# Patient Record
Sex: FEMALE | Race: BLACK OR AFRICAN AMERICAN | Employment: OTHER | URBAN - METROPOLITAN AREA
[De-identification: names, ages, dates, MRNs, and addresses within clinical notes are randomized per-mention and may not be internally consistent; named-entity substitution may affect disease eponyms.]

---

## 2017-02-14 ENCOUNTER — OFFICE VISIT (OUTPATIENT)
Dept: INTERNAL MEDICINE CLINIC | Age: 67
End: 2017-02-14

## 2017-02-14 VITALS
HEIGHT: 64 IN | DIASTOLIC BLOOD PRESSURE: 82 MMHG | SYSTOLIC BLOOD PRESSURE: 155 MMHG | HEART RATE: 104 BPM | WEIGHT: 149 LBS | RESPIRATION RATE: 21 BRPM | TEMPERATURE: 98.2 F | BODY MASS INDEX: 25.44 KG/M2 | OXYGEN SATURATION: 95 %

## 2017-02-14 DIAGNOSIS — E11.9 TYPE 2 DIABETES MELLITUS WITHOUT COMPLICATION, WITHOUT LONG-TERM CURRENT USE OF INSULIN (HCC): ICD-10-CM

## 2017-02-14 DIAGNOSIS — K76.0 FATTY LIVER: ICD-10-CM

## 2017-02-14 DIAGNOSIS — I10 ESSENTIAL HYPERTENSION: ICD-10-CM

## 2017-02-14 DIAGNOSIS — R07.89 OTHER CHEST PAIN: Primary | ICD-10-CM

## 2017-02-14 NOTE — MR AVS SNAPSHOT
Visit Information Date & Time Provider Department Dept. Phone Encounter #  
 2/14/2017  1:00 PM Kiki Brink [facility] [phone] [id number] Follow-up Instructions Return in about 6 months (around 8/14/2017). Follow-up and Disposition History Upcoming Health Maintenance Date Due  
 EYE EXAM RETINAL OR DILATED Q1 3/16/2016 MICROALBUMIN Q1 2/24/2017 LIPID PANEL Q1 2/24/2017 BREAST CANCER SCRN MAMMOGRAM 3/16/2017 HEMOGLOBIN A1C Q6M 3/30/2017 FOOT EXAM Q1 9/30/2017 FOBT Q 1 YEAR AGE 50-75 2/14/2018 PAP AKA CERVICAL CYTOLOGY 10/21/2018 DTaP/Tdap/Td series (2 - Td) 2/14/2027 Allergies as of 2/14/2017  Review Complete On: 2/14/2017 By: Jossy Terry MD  
  
 Severity Noted Reaction Type Reactions Pcn [Penicillins]  09/08/2014    Unknown (comments) Current Immunizations  Never Reviewed No immunizations on file. Not reviewed this visit You Were Diagnosed With   
  
 Codes Comments Other chest pain    -  Primary ICD-10-CM: R07.89 ICD-9-CM: 786.59 Type 2 diabetes mellitus without complication, without long-term current use of insulin (HCC)     ICD-10-CM: E11.9 ICD-9-CM: 250.00 Essential hypertension     ICD-10-CM: I10 
ICD-9-CM: 401.9 Fatty liver     ICD-10-CM: K76.0 ICD-9-CM: 571.8 Vitals BP Pulse Temp Resp Height(growth percentile) Weight(growth percentile) 155/82 (BP 1 Location: Right arm, BP Patient Position: Sitting) (!) 104 98.2 °F (36.8 °C) (Oral) 21 5' 4\" (1.626 m) 149 lb (67.6 kg) SpO2 BMI OB Status Smoking Status 95% 25.58 kg/m2 Having regular periods Never Smoker BMI and BSA Data Body Mass Index Body Surface Area 25.58 kg/m 2 1.75 m 2 Preferred Pharmacy Pharmacy Name Phone [address], [address] [phone] Your Updated Medication List  
  
   
This list is accurate as of: 2/14/17  2:55 PM.  Always use your most recent med list.  
  
  
  
  
 celecoxib 200 mg capsule Commonly known as:  CELEBREX  
TAKE 1 CAPSULE BY MOUTH TWICE DAILY losartan 50 mg tablet Commonly known as:  COZAAR  
TAKE 1 TABLET BY MOUTH EVERY DAY  
  
 metFORMIN  mg tablet Commonly known as:  GLUCOPHAGE XR  
TAKE 1 TABLET BY MOUTH EVERY EVENING WITH SUPPER [facility] Generic drug:  Lancets USE DAILY AS DIRECTED  
  
 pravastatin 40 mg tablet Commonly known as:  PRAVACHOL  
TAKE 1 TABLET BY MOUTH EVERY EVENING We Performed the Following AMB POC EKG ROUTINE W/ 12 LEADS, INTER & REP [39848 CPT(R)] CBC WITH AUTOMATED DIFF [11600 CPT(R)] HEMOGLOBIN A1C WITH EAG [75076 CPT(R)] LIPID PANEL [66126 CPT(R)] METABOLIC PANEL, COMPREHENSIVE [05984 CPT(R)] TX COLLECTION VENOUS BLOOD,VENIPUNCTURE [id number] CPT(R)] TSH 3RD GENERATION [98567 CPT(R)] URINALYSIS W/ RFLX MICROSCOPIC [69890 CPT(R)] Follow-up Instructions Return in about 6 months (around 8/14/2017). To-Do List   
 02/14/2017 ECHO:  2D ECHO COMPLETE ADULT (TTE) W OR WO CONTR Introducing [facility]! Dear Conrado Gave: Thank you for requesting a [employer] account. Our records indicate that you already have an active [employer] account. You can access your account anytime at [email or web address] Have You. [email or web address] [employer] Have You Did you know that you can access your hospital and ER discharge instructions at any time in [employer]? You can also review all of your test results from your hospital stay or ER visit. Additional Information If you have questions, please visit the Frequently Asked Questions section of the [employer] website at [email or web address]. [email or web address] [employer] Have You/. Remember, [employer] is NOT to be used for urgent needs. For medical emergencies, dial 911. Now available from your iPhone and Android! Please provide this summary of care documentation to your next provider. Your primary care clinician is listed as Pritesh Azevedo. If you have any questions after today's visit, please call [phone].

## 2017-02-14 NOTE — PROGRESS NOTES
.1. Have you been to the ER, urgent care clinic since your last visit? Hospitalized since your last visit? No    2. Have you seen or consulted any other health care providers outside of the [address] [place] [address] since your last visit? Include any pap smears or colon screening.  No

## 2017-02-14 NOTE — PROGRESS NOTES
SP[facility] MEDICINE [facility]  Emmanuel Byrne MD, Tenzin Hemphill[address] [address]  Phone:  [phone]  Fax: [phone]      Chief Complaint   Patient presents with    Diabetes   Patient returns today ambulatory, alert and appropriate and has the capacity to give an accurate history. She has a known history of fatty liver, diabetes, primary hypertension, and is seen for evaluation. Patient returns today complaining of substernal chest discomfort described as an indigestion feeling with some radiation to the left. It is not associated with shortness of breath or diaphoresis, not necessarily activity related. Relieved with rest however with ten minutes of laying down. Other new complaints are denied. Patient with a known history of dyslipidemia, diabetes is concerning. SUBECTIVE:    Ash Álvarez is a 61 y.o. female +++    Current Outpatient Prescriptions   Medication Sig Dispense Refill    pravastatin (PRAVACHOL) 40 mg tablet TAKE 1 TABLET BY MOUTH EVERY EVENING 90 Tab 11    celecoxib (CELEBREX) 200 mg capsule TAKE 1 CAPSULE BY MOUTH TWICE DAILY 180 Cap 2    losartan (COZAAR) 50 mg tablet TAKE 1 TABLET BY MOUTH EVERY DAY 90 Tab 11    metFORMIN ER (GLUCOPHAGE XR) 500 mg tablet TAKE 1 TABLET BY MOUTH EVERY EVENING WITH SUPPER 90 Tab 11    MICROLET LANCET misc USE DAILY AS DIRECTED 100 Each 11     Past Medical History   Diagnosis Date    Chest pain     Diabetes ([address]     Fatty liver     Gall bladder stones     Hypertension     LBP (low back pain)     S/P colonoscopy 2014    Tenosynovitis of thumb 6/29/16     History reviewed. No pertinent past surgical history. Allergies   Allergen Reactions    Pcn [Penicillins] Unknown (comments)       REVIEW OF SYSTEMS:   No nausea or vomiting.          Social History     Social History    Marital status:      Spouse name: N/A    Number of children: N/A    Years of education: N/A     Social History Main Topics    Smoking status: Never Smoker    Smokeless tobacco: Never Used    Alcohol use No    Drug use: None    Sexual activity: Not Asked     Other Topics Concern    None     Social History Narrative    Social History: Alcohol Use Patient uses alcohol, Drinks per occasion: 2, Drinks per w [place]: 0. Smoking Status Patient is a never smoker. Marital Status: , . Lives w ith: spouse. 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 Education/School: has highschool diploma, has college diploma. [employer]: [employer].       r  Family History   Problem Relation Age of Onset    Cancer Father        OBJECTIVE:  Visit Vitals    /82 (BP 1 Location: Right arm, BP Patient Position: Sitting)    Pulse (!) 104    Temp 98.2 °F (36.8 °C) (Oral)    Resp 21    Ht 5' 4\" (1.626 m)    Wt 149 lb (67.6 kg)    SpO2 95%    BMI 25.58 kg/m2     ENT: perrla,  eom intact  NECK: supple. Thyroid normal  CHEST: clear to ascultation and percussion   HEART: regular rate and rhythm  ABD: soft, bowel sounds active  EXTREMITIES: no edema, pulse 1+     No visits with results within 3 Month(s) from this visit. Latest known visit with results is:    Office Visit on 09/30/2016   Component Date Value Ref Range Status    WBC 09/30/2016 5.1  3.4 - 10.8 x10E3/uL Final    RBC 09/30/2016 4.37  3.77 - 5.28 x10E6/uL Final    HGB 09/30/2016 12.9  11.1 - 15.9 g/dL Final    HCT 09/30/2016 38.5  34.0 - 46.6 % Final    MCV 09/30/2016 88  79 - 97 fL Final    MCH 09/30/2016 29.5  26.6 - 33.0 pg Final    MCHC 09/30/2016 33.5  31.5 - 35.7 g/dL Final    RDW 09/30/2016 13.3  12.3 - 15.4 % Final    PLATELET 83/35/9891 333  150 - 379 x10E3/uL Final    NEUTROPHILS 09/30/2016 41  % Final    Lymphocytes 09/30/2016 43  % Final    MONOCYTES 09/30/2016 13  % Final    EOSINOPHILS 09/30/2016 2  % Final    BASOPHILS 09/30/2016 1  % Final    ABS.  NEUTROPHILS 09/30/2016 2.1  1.4 - 7.0 x10E3/uL Final    Abs Lymphocytes 09/30/2016 2.2  0.7 - 3.1 x10E3/uL Final    ABS. MONOCYTES 09/30/2016 0.7  0.1 - 0.9 x10E3/uL Final    ABS. EOSINOPHILS 09/30/2016 0.1  0.0 - 0.4 x10E3/uL Final    ABS. BASOPHILS 09/30/2016 0.0  0.0 - 0.2 x10E3/uL Final    IMMATURE GRANULOCYTES 09/30/2016 0  % Final    ABS. IMM. GRANS. 09/30/2016 0.0  0.0 - 0.1 x10E3/uL Final    Hemoglobin A1c 09/30/2016 6.7* 4.8 - 5.6 % Final    Comment:          Pre-diabetes: 5.7 - 6.4           Diabetes: >6.4           Glycemic control for adults with diabetes: <7.0      Estimated average glucose 09/30/2016 146  mg/dL Final          ASSESSMENT:  1. Other chest pain    2. Type 2 diabetes mellitus without complication, without long-term current use of insulin ([facility] [address]    3. Essential hypertension    4. Fatty liver      Patient's chest pain is concerning. We will ask for an EKG today as well as obtain a stress echocardiogram.      Appropriate laboratory studies will be checked. If the cardiac evaluation is negative she will see us in six months, sooner if it is positive. Laboratory studies will be mailed to her as well as the stress test results. Blood pressure repeat is 142/80. PLAN:  .  Orders Placed This Encounter    URINALYSIS W/ RFLX MICROSCOPIC    CBC WITH AUTOMATED DIFF    METABOLIC PANEL, COMPREHENSIVE    LIPID PANEL    TSH 3RD GENERATION    HEMOGLOBIN A1C WITH EAG    AMB POC EKG ROUTINE W/ 12 LEADS, INTER & REP    2D ECHO COMPLETE ADULT (TTE) W OR WO CONTR       Follow-up Disposition:  Return in about 6 months (around 8/14/2017). ATTENTION:   This medical record was transcribed using an electronic medical records system. Although proofread, it may and can contain electronic and spelling errors. Other human spelling and other errors may be present. Corrections may be executed at a later time. Please feel free to contact us for any clarifications as needed.

## 2017-02-15 LAB
ALBUMIN SERPL-MCNC: 4.9 G/DL (ref 3.6–4.8)
ALBUMIN/GLOB SERPL: 1.7 {RATIO} (ref 1.1–2.5)
ALP SERPL-CCNC: 74 IU/L (ref 39–117)
ALT SERPL-CCNC: 10 IU/L (ref 0–32)
APPEARANCE UR: ABNORMAL
AST SERPL-CCNC: 28 IU/L (ref 0–40)
BASOPHILS # BLD AUTO: 0 X10E3/UL (ref 0–0.2)
BASOPHILS NFR BLD AUTO: 0 %
BILIRUB SERPL-MCNC: 0.6 MG/DL (ref 0–1.2)
BILIRUB UR QL STRIP: NEGATIVE
BUN SERPL-MCNC: 11 MG/DL (ref 8–27)
BUN/CREAT SERPL: 16 (ref 11–26)
CALCIUM SERPL-MCNC: 9.9 MG/DL (ref 8.7–10.3)
CHLORIDE SERPL-SCNC: 103 MMOL/L (ref 96–106)
CHOLEST SERPL-MCNC: 204 MG/DL (ref 100–199)
CO2 SERPL-SCNC: 26 MMOL/L (ref 18–29)
COLOR UR: YELLOW
CREAT SERPL-MCNC: 0.69 MG/DL (ref 0.57–1)
EOSINOPHIL # BLD AUTO: 0.1 X10E3/UL (ref 0–0.4)
EOSINOPHIL NFR BLD AUTO: 2 %
ERYTHROCYTE [DISTWIDTH] IN BLOOD BY AUTOMATED COUNT: 13.8 % (ref 12.3–15.4)
EST. AVERAGE GLUCOSE BLD GHB EST-MCNC: 137 MG/DL
GLOBULIN SER CALC-MCNC: 2.9 G/DL (ref 1.5–4.5)
GLUCOSE SERPL-MCNC: 73 MG/DL (ref 65–99)
GLUCOSE UR QL: NEGATIVE
HBA1C MFR BLD: 6.4 % (ref 4.8–5.6)
HCT VFR BLD AUTO: 42.6 % (ref 34–46.6)
HDLC SERPL-MCNC: 59 MG/DL
HGB BLD-MCNC: 14.1 G/DL (ref 11.1–15.9)
HGB UR QL STRIP: NEGATIVE
IMM GRANULOCYTES # BLD: 0 X10E3/UL (ref 0–0.1)
IMM GRANULOCYTES NFR BLD: 0 %
KETONES UR QL STRIP: NEGATIVE
LDLC SERPL CALC-MCNC: 116 MG/DL (ref 0–99)
LEUKOCYTE ESTERASE UR QL STRIP: NEGATIVE
LYMPHOCYTES # BLD AUTO: 3.2 X10E3/UL (ref 0.7–3.1)
LYMPHOCYTES NFR BLD AUTO: 57 %
MCH RBC QN AUTO: 29.4 PG (ref 26.6–33)
MCHC RBC AUTO-ENTMCNC: 33.1 G/DL (ref 31.5–35.7)
MCV RBC AUTO: 89 FL (ref 79–97)
MICRO URNS: ABNORMAL
MONOCYTES # BLD AUTO: 0.4 X10E3/UL (ref 0.1–0.9)
MONOCYTES NFR BLD AUTO: 6 %
NEUTROPHILS # BLD AUTO: 2 X10E3/UL (ref 1.4–7)
NEUTROPHILS NFR BLD AUTO: 35 %
NITRITE UR QL STRIP: NEGATIVE
PH UR STRIP: 7 [PH] (ref 5–7.5)
PLATELET # BLD AUTO: 273 X10E3/UL (ref 150–379)
POTASSIUM SERPL-SCNC: 4.2 MMOL/L (ref 3.5–5.2)
PROT SERPL-MCNC: 7.8 G/DL (ref 6–8.5)
PROT UR QL STRIP: NEGATIVE
RBC # BLD AUTO: 4.79 X10E6/UL (ref 3.77–5.28)
SODIUM SERPL-SCNC: 146 MMOL/L (ref 134–144)
SP GR UR: 1.02 (ref 1–1.03)
TRIGL SERPL-MCNC: 144 MG/DL (ref 0–149)
TSH SERPL DL<=0.005 MIU/L-ACNC: 2.16 UIU/ML (ref 0.45–4.5)
UROBILINOGEN UR STRIP-MCNC: 0.2 MG/DL (ref 0.2–1)
VLDLC SERPL CALC-MCNC: 29 MG/DL (ref 5–40)
WBC # BLD AUTO: 5.7 X10E3/UL (ref 3.4–10.8)

## 2017-02-16 ENCOUNTER — TELEPHONE (OUTPATIENT)
Dept: INTERNAL MEDICINE CLINIC | Age: 67
End: 2017-02-16

## 2017-02-20 ENCOUNTER — CLINICAL SUPPORT (OUTPATIENT)
Dept: INTERNAL MEDICINE CLINIC | Age: 67
End: 2017-02-20

## 2017-02-20 DIAGNOSIS — R77.1 ELEVATED SERUM GLOBULIN LEVEL: Primary | ICD-10-CM

## 2017-02-22 LAB
ALBUMIN SERPL ELPH-MCNC: 3.9 G/DL (ref 2.9–4.4)
ALBUMIN/GLOB SERPL: 1.1 {RATIO} (ref 0.7–1.7)
ALPHA1 GLOB SERPL ELPH-MCNC: 0.2 G/DL (ref 0–0.4)
ALPHA2 GLOB SERPL ELPH-MCNC: 0.8 G/DL (ref 0.4–1)
B-GLOBULIN SERPL ELPH-MCNC: 1.2 G/DL (ref 0.7–1.3)
GAMMA GLOB SERPL ELPH-MCNC: 1.2 G/DL (ref 0.4–1.8)
GLOBULIN SER CALC-MCNC: 3.4 G/DL (ref 2.2–3.9)
IGA SERPL-MCNC: 190 MG/DL (ref 87–352)
IGG SERPL-MCNC: 1065 MG/DL (ref 700–1600)
IGM SERPL-MCNC: 39 MG/DL (ref 26–217)
M PROTEIN SERPL ELPH-MCNC: NORMAL G/DL
PLEASE NOTE, 011150: NORMAL
PROT SERPL-MCNC: 7.3 G/DL (ref 6–8.5)

## 2017-02-23 ENCOUNTER — HOSPITAL ENCOUNTER (OUTPATIENT)
Dept: NON INVASIVE DIAGNOSTICS | Age: 67
Discharge: HOME | End: 2017-02-23
Attending: INTERNAL MEDICINE
Payer: OTHER GOVERNMENT

## 2017-02-23 DIAGNOSIS — R07.89 OTHER CHEST PAIN: ICD-10-CM

## 2017-02-23 DIAGNOSIS — E11.9 TYPE 2 DIABETES MELLITUS WITHOUT COMPLICATION, WITHOUT LONG-TERM CURRENT USE OF INSULIN (HCC): ICD-10-CM

## 2017-02-23 PROCEDURE — 93306 TTE W/DOPPLER COMPLETE: CPT

## 2017-05-11 RX ORDER — CLOTRIMAZOLE AND BETAMETHASONE DIPROPIONATE 10; .64 MG/G; MG/G
CREAM TOPICAL
Qty: 15 G | Refills: 0 | Status: SHIPPED | OUTPATIENT
Start: 2017-05-11 | End: 2019-10-03

## 2017-05-11 RX ORDER — FLUCONAZOLE 150 MG/1
150 TABLET ORAL DAILY
Qty: 1 TAB | Refills: 0 | Status: SHIPPED | OUTPATIENT
Start: 2017-05-11 | End: 2017-05-12

## 2017-08-30 ENCOUNTER — OFFICE VISIT (OUTPATIENT)
Dept: INTERNAL MEDICINE CLINIC | Age: 67
End: 2017-08-30

## 2017-08-30 VITALS
OXYGEN SATURATION: 94 % | RESPIRATION RATE: 18 BRPM | BODY MASS INDEX: 25.06 KG/M2 | TEMPERATURE: 33.8 F | SYSTOLIC BLOOD PRESSURE: 132 MMHG | WEIGHT: 146.8 LBS | HEIGHT: 64 IN | HEART RATE: 98 BPM | DIASTOLIC BLOOD PRESSURE: 86 MMHG

## 2017-08-30 DIAGNOSIS — I10 ESSENTIAL HYPERTENSION: ICD-10-CM

## 2017-08-30 DIAGNOSIS — E11.9 TYPE 2 DIABETES MELLITUS WITHOUT COMPLICATION, WITHOUT LONG-TERM CURRENT USE OF INSULIN (HCC): Primary | ICD-10-CM

## 2017-08-30 DIAGNOSIS — K76.0 FATTY LIVER: ICD-10-CM

## 2017-08-30 NOTE — PROGRESS NOTES
Chief Complaint   Patient presents with    Pre-op Exam     cataract surgery on 9/7/17     1. Have you been to the ER, urgent care clinic since your last visit? Hospitalized since your last visit? No    2. Have you seen or consulted any other health care providers outside of the [address] [place] [address] since your last visit? Include any pap smears or colon screening.  No

## 2017-08-30 NOTE — PROGRESS NOTES
Patient returns today alert, appropriate, ambulatory and has the capacity to give an accurate history. She plans to have removal of cataract implantation for ocular lens on the left eye by Brandon Lund ***, M.D., on 09/07/17. She has a known history of diabetes, on Metformin, blood sugar control has been excellent, fatty liver and primary hypertension. Patient is seen for evaluation.

## 2017-08-30 NOTE — PROGRESS NOTES
SPORTS MEDICINE [facility] PRIMARY CARE  Hilario Stark MD, [address] [address]  Phone:  [phone]  Fax: [phone]      Chief Complaint   Patient presents with    Pre-op Exam     cataract surgery on 9/7/17         SUBECTIVE:    Rui Dumont is a 74 y.o. female Patient returns today with known history of diabetes, primary hypertension and dyslipidemia. She voices no complaints. Her and her  are trying to sell their house, but they're concerned  sell the house. They are going to take it off the market if they don't sell it shortly and try again in the spring. Patient is seen for evaluation. She plans to have  intraocular lens on 09/07/17 . Current Outpatient Prescriptions   Medication Sig Dispense Refill    clotrimazole-betamethasone (LOTRISONE) topical cream Apply outside vagina twice a day 15 g 0    metFORMIN ER (GLUCOPHAGE XR) 500 mg tablet TAKE 1 TABLET BY MOUTH EVERY EVENING WITH SUPPER 90 Tab 11    losartan (COZAAR) 50 mg tablet TAKE 1 TABLET BY MOUTH EVERY DAY 90 Tab 11    pravastatin (PRAVACHOL) 40 mg tablet TAKE 1 TABLET BY MOUTH EVERY EVENING 90 Tab 11    MICROLET LANCET misc USE DAILY AS DIRECTED 100 Each 11    celecoxib (CELEBREX) 200 mg capsule TAKE 1 CAPSULE BY MOUTH TWICE DAILY 180 Cap 2     Past Medical History:   Diagnosis Date    Chest pain     Diabetes ([address]     Fatty liver     Gall bladder stones     Hypertension     LBP (low back pain)     S/P colonoscopy 2014    Tenosynovitis of thumb 6/29/16     History reviewed. No pertinent surgical history. Allergies   Allergen Reactions    Pcn [Penicillins] Unknown (comments)       REVIEW OF SYSTEMS:   No chest pain, no shortness of breath.         Social History     Social History    Marital status:      Spouse name: N/A    Number of children: N/A    Years of education: N/A     Social History Main Topics    Smoking status: Never Smoker    Smokeless tobacco: Never Used    Alcohol use No    Drug use: No    Sexual activity: Not Asked     Other Topics Concern    None     Social History Narrative    Social History: Alcohol Use Patient uses alcohol, Drinks per occasion: 2, Drinks per w [place]: 0. Smoking Status Patient is a never smoker. Marital Status: , . Lives w ith: spouse. 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 Education/School: has highschool diploma, has college diploma. [employer]: [employer].       r  Family History   Problem Relation Age of Onset    Cancer Father        OBJECTIVE:  Visit Vitals    /81 (BP 1 Location: Left arm, BP Patient Position: Sitting)    Pulse 98    Temp (!) 33.8 °F (1 °C)    Resp 18    Ht 5' 4\" (1.626 m)    Wt 146 lb 12.8 oz (66.6 kg)    SpO2 94%    BMI 25.2 kg/m2     ENT: perrla,  eom intact  NECK: supple. Thyroid normal  CHEST: clear to ascultation and percussion   HEART: regular rate and rhythm  ABD: soft, bowel sounds active  EXTREMITIES: no edema, pulse 1+     No visits with results within 3 Month(s) from this visit. Latest known visit with results is:    Clinical Support on 02/20/2017   Component Date Value Ref Range Status    Protein, total 02/20/2017 7.3  6.0 - 8.5 g/dL Final    Albumin 02/20/2017 3.9  2.9 - 4.4 g/dL Final    Alpha-1-globulin 02/20/2017 0.2  0.0 - 0.4 g/dL Final    ALPHA-2 GLOBULIN 02/20/2017 0.8  0.4 - 1.0 g/dL Final    Beta globulin 02/20/2017 1.2  0.7 - 1.3 g/dL Final    Gamma globulin 02/20/2017 1.2  0.4 - 1.8 g/dL Final    M-spike 02/20/2017 Not Observed  Not Observed g/dL Final    Globulin, total 02/20/2017 3.4  2.2 - 3.9 g/dL Final    A/G ratio 02/20/2017 1.1  0.7 - 1.7 Final    Please note 02/20/2017 Comment   Final    Comment: Protein electrophoresis scan will follow via computer, mail, or   delivery.       Immunoglobulin G, Qt. 02/20/2017 1065  700 - 1600 mg/dL Final    Immunoglobulin A, Qt. 02/20/2017 190  87 - 352 mg/dL Final    Immunoglobulin M, Qt. 02/20/2017 39  26 - 217 mg/dL Final          ASSESSMENT:  1. Type 2 diabetes mellitus without complication, without long-term current use of insulin ([address]    2. Essential hypertension    3. Fatty liver      Blood sugar control has been excellent. Patient's blood pressure control repeat is improved and down to 150/86. I suspect it drops down to normal outside our office. Her medical status is stable for planned surgical procedure. She'll return to see us in four to six months. PLAN:  .  Orders Placed This Encounter    METABOLIC PANEL, COMPREHENSIVE    HEMOGLOBIN A1C WITH EAG    CBC WITH AUTOMATED DIFF       Follow-up Disposition:  Return in about 6 months (around 2/28/2018). ATTENTION:   This medical record was transcribed using an electronic medical records system. Although proofread, it may and can contain electronic and spelling errors. Other human spelling and other errors may be present. Corrections may be executed at a later time. Please feel free to contact us for any clarifications as needed.

## 2017-08-30 NOTE — MR AVS SNAPSHOT
Visit Information Date & Time Provider Department Dept. Phone Encounter #  
 8/30/2017  9:15 AM Kiki Soliz [facility] [phone] [id number] Follow-up Instructions Return in about 6 months (around 2/28/2018). Follow-up and Disposition History Upcoming Health Maintenance Date Due MICROALBUMIN Q1 2/24/2017 BREAST CANCER SCRN MAMMOGRAM 3/16/2017 OSTEOPOROSIS SCREENING (DEXA) 5/21/2017 Pneumococcal 65+ Low/Medium Risk (1 of 2 - PCV13) 5/21/2017 MEDICARE YEARLY EXAM 5/21/2017 EYE EXAM RETINAL OR DILATED Q1 7/25/2017 INFLUENZA AGE 9 TO ADULT 8/1/2017 HEMOGLOBIN A1C Q6M 8/14/2017 FOOT EXAM Q1 9/30/2017 LIPID PANEL Q1 2/14/2018 FOBT Q 1 YEAR AGE 50-75 2/14/2018 GLAUCOMA SCREENING Q2Y 7/25/2018 DTaP/Tdap/Td series (2 - Td) 2/14/2027 Allergies as of 8/30/2017  Review Complete On: 8/30/2017 By: Kofi Leblanc Severity Noted Reaction Type Reactions Pcn [Penicillins]  09/08/2014    Unknown (comments) Current Immunizations  Never Reviewed No immunizations on file. Not reviewed this visit You Were Diagnosed With   
  
 Codes Comments Type 2 diabetes mellitus without complication, without long-term current use of insulin (HCC)    -  Primary ICD-10-CM: E11.9 ICD-9-CM: 250.00 Essential hypertension     ICD-10-CM: I10 
ICD-9-CM: 401.9 Fatty liver     ICD-10-CM: K76.0 ICD-9-CM: 571.8 Vitals BP Pulse Temp Resp Height(growth percentile) Weight(growth percentile) 132/86 98 (!) 33.8 °F (1 °C) 18 5' 4\" (1.626 m) 146 lb 12.8 oz (66.6 kg) SpO2 BMI OB Status Smoking Status 94% 25.2 kg/m2 Postmenopausal Never Smoker Vitals History BMI and BSA Data Body Mass Index Body Surface Area  
 25.2 kg/m 2 1.73 m 2 Preferred Pharmacy Pharmacy Name Phone  [facility] [address] [place] [address] [phone] Your Updated Medication List  
  
   
This list is accurate as of: 8/30/17 11:02 AM.  Always use your most recent med list.  
  
  
  
  
 celecoxib 200 mg capsule Commonly known as:  CELEBREX  
TAKE 1 CAPSULE BY MOUTH TWICE DAILY  
  
 clotrimazole-betamethasone topical cream  
Commonly known as:  Janie Avoca Apply outside vagina twice a day  
  
 losartan 50 mg tablet Commonly known as:  COZAAR  
TAKE 1 TABLET BY MOUTH EVERY DAY  
  
 metFORMIN  mg tablet Commonly known as:  GLUCOPHAGE XR  
TAKE 1 TABLET BY MOUTH EVERY EVENING WITH SUPPER [facility] Generic drug:  Lancets USE DAILY AS DIRECTED  
  
 pravastatin 40 mg tablet Commonly known as:  PRAVACHOL  
TAKE 1 TABLET BY MOUTH EVERY EVENING We Performed the Following CBC WITH AUTOMATED DIFF [29081 CPT(R)] HEMOGLOBIN A1C WITH EAG [41779 CPT(R)] METABOLIC PANEL, COMPREHENSIVE [12080 CPT(R)] TX COLLECTION VENOUS BLOOD,VENIPUNCTURE [id number] CPT(R)] Follow-up Instructions Return in about 6 months (around 2/28/2018). Introducing [facility]! Dear Ricardo Councilman: Thank you for requesting a [employer] account. Our records indicate that you already have an active [employer] account. You can access your account anytime at [email or web address] Did you know that you can access your hospital and ER discharge instructions at any time in [employer]? You can also review all of your test results from your hospital stay or ER visit. Additional Information If you have questions, please visit the Frequently Asked Questions section of the [employer] website at [email or web address] Remember, [employer] is NOT to be used for urgent needs. For medical emergencies, dial 911. Now available from your iPhone and Android! Please provide this summary of care documentation to your next provider. Your primary care clinician is listed as Citlalli Durant. If you have any questions after today's visit, please call [phone].

## 2017-08-31 LAB
ALBUMIN SERPL-MCNC: 4.8 G/DL (ref 3.6–4.8)
ALBUMIN/GLOB SERPL: 1.6 {RATIO} (ref 1.2–2.2)
ALP SERPL-CCNC: 71 IU/L (ref 39–117)
ALT SERPL-CCNC: 16 IU/L (ref 0–32)
AST SERPL-CCNC: 37 IU/L (ref 0–40)
BASOPHILS # BLD AUTO: 0 X10E3/UL (ref 0–0.2)
BASOPHILS NFR BLD AUTO: 1 %
BILIRUB SERPL-MCNC: 0.6 MG/DL (ref 0–1.2)
BUN SERPL-MCNC: 16 MG/DL (ref 8–27)
BUN/CREAT SERPL: 19 (ref 12–28)
CALCIUM SERPL-MCNC: 10.1 MG/DL (ref 8.7–10.3)
CHLORIDE SERPL-SCNC: 103 MMOL/L (ref 96–106)
CO2 SERPL-SCNC: 25 MMOL/L (ref 18–29)
CREAT SERPL-MCNC: 0.83 MG/DL (ref 0.57–1)
EOSINOPHIL # BLD AUTO: 0.2 X10E3/UL (ref 0–0.4)
EOSINOPHIL NFR BLD AUTO: 4 %
ERYTHROCYTE [DISTWIDTH] IN BLOOD BY AUTOMATED COUNT: 13.9 % (ref 12.3–15.4)
EST. AVERAGE GLUCOSE BLD GHB EST-MCNC: 126 MG/DL
GLOBULIN SER CALC-MCNC: 3 G/DL (ref 1.5–4.5)
GLUCOSE SERPL-MCNC: 102 MG/DL (ref 65–99)
HBA1C MFR BLD: 6 % (ref 4.8–5.6)
HCT VFR BLD AUTO: 43.6 % (ref 34–46.6)
HGB BLD-MCNC: 14.2 G/DL (ref 11.1–15.9)
IMM GRANULOCYTES # BLD: 0 X10E3/UL (ref 0–0.1)
IMM GRANULOCYTES NFR BLD: 0 %
LYMPHOCYTES # BLD AUTO: 2.3 X10E3/UL (ref 0.7–3.1)
LYMPHOCYTES NFR BLD AUTO: 49 %
MCH RBC QN AUTO: 29.3 PG (ref 26.6–33)
MCHC RBC AUTO-ENTMCNC: 32.6 G/DL (ref 31.5–35.7)
MCV RBC AUTO: 90 FL (ref 79–97)
MONOCYTES # BLD AUTO: 0.3 X10E3/UL (ref 0.1–0.9)
MONOCYTES NFR BLD AUTO: 6 %
NEUTROPHILS # BLD AUTO: 1.8 X10E3/UL (ref 1.4–7)
NEUTROPHILS NFR BLD AUTO: 40 %
PLATELET # BLD AUTO: 255 X10E3/UL (ref 150–379)
POTASSIUM SERPL-SCNC: 4.6 MMOL/L (ref 3.5–5.2)
PROT SERPL-MCNC: 7.8 G/DL (ref 6–8.5)
RBC # BLD AUTO: 4.85 X10E6/UL (ref 3.77–5.28)
SODIUM SERPL-SCNC: 143 MMOL/L (ref 134–144)
WBC # BLD AUTO: 4.6 X10E3/UL (ref 3.4–10.8)

## 2017-12-18 RX ORDER — LANCETS
EACH MISCELLANEOUS
Qty: 100 EACH | Refills: 11 | Status: SHIPPED | OUTPATIENT
Start: 2017-12-18 | End: 2020-09-09 | Stop reason: SDUPTHER

## 2018-02-01 RX ORDER — PRAVASTATIN SODIUM 40 MG/1
TABLET ORAL
Qty: 90 TAB | Refills: 0 | Status: SHIPPED | OUTPATIENT
Start: 2018-02-01 | End: 2018-05-22 | Stop reason: SDUPTHER

## 2018-03-07 ENCOUNTER — OFFICE VISIT (OUTPATIENT)
Dept: INTERNAL MEDICINE CLINIC | Age: 68
End: 2018-03-07

## 2018-03-07 VITALS
SYSTOLIC BLOOD PRESSURE: 160 MMHG | OXYGEN SATURATION: 94 % | HEIGHT: 64 IN | TEMPERATURE: 97.5 F | DIASTOLIC BLOOD PRESSURE: 101 MMHG | HEART RATE: 93 BPM | RESPIRATION RATE: 18 BRPM | WEIGHT: 146.1 LBS | BODY MASS INDEX: 24.94 KG/M2

## 2018-03-07 DIAGNOSIS — E11.9 TYPE 2 DIABETES MELLITUS WITHOUT COMPLICATION, WITHOUT LONG-TERM CURRENT USE OF INSULIN (HCC): ICD-10-CM

## 2018-03-07 DIAGNOSIS — M70.52 PES ANSERINUS BURSITIS OF LEFT KNEE: ICD-10-CM

## 2018-03-07 DIAGNOSIS — K76.0 FATTY LIVER: ICD-10-CM

## 2018-03-07 DIAGNOSIS — I10 ESSENTIAL HYPERTENSION: Primary | ICD-10-CM

## 2018-03-07 RX ORDER — LOSARTAN POTASSIUM 100 MG/1
100 TABLET ORAL DAILY
Qty: 90 TAB | Refills: 11 | Status: SHIPPED | OUTPATIENT
Start: 2018-03-07 | End: 2019-05-03 | Stop reason: SDUPTHER

## 2018-03-07 NOTE — PROGRESS NOTES
SPORTS MEDICINE AND PRIMARY CARE  Angelita De Santiago MD, [address] [address]  Phone:  [phone]  Fax: [phone]      Chief Complaint   Patient presents with    Diabetes     f/u         SUBECTIVE:    Nirmala Hernandez is a 74 y.o. female The patient returns today with known history of diabetes, primary hypertension and fatty liver. She continues to have pain in her knee. She has tried various maneuvers but refuses to take NSAIDs because of the listing of hepatotoxicity. The patient is seen for evaluation. Current Outpatient Prescriptions   Medication Sig Dispense Refill    losartan (COZAAR) 100 mg tablet Take 1 Tab by mouth daily. 90 Tab 11    pravastatin (PRAVACHOL) 40 mg tablet TAKE 1 TABLET BY MOUTH EVERY EVENING 90 Tab 0    Lancets (MICROLET LANCET) misc Use to test blood sugar once daily. Dx.e11.9 100 Each 11    glucose blood VI test strips (CONTOUR NEXT STRIPS) strip Use to test blood sugar once daily. Dx.e11.9 50 Strip 11    metFORMIN ER (GLUCOPHAGE XR) 500 mg tablet TAKE 1 TABLET BY MOUTH EVERY EVENING WITH SUPPER 90 Tab 11    clotrimazole-betamethasone (LOTRISONE) topical cream Apply outside vagina twice a day 15 g 0     Past Medical History:   Diagnosis Date    Bursitis of knee     Chest pain     Diabetes ([address]     Fatty liver     Gall bladder stones     Hypertension     LBP (low back pain)     S/P colonoscopy 2014    Tenosynovitis of thumb 6/29/16     History reviewed. No pertinent surgical history. Allergies   Allergen Reactions    Pcn [Penicillins] Unknown (comments)       REVIEW OF SYSTEMS:   No chest pain, no shortness of breath.             Social History     Social History    Marital status:      Spouse name: N/A    Number of children: N/A    Years of education: N/A     Social History Main Topics    Smoking status: Never Smoker    Smokeless tobacco: Never Used    Alcohol use No    Drug use: No    Sexual activity: Yes Partners: Male     Other Topics Concern    None     Social History Narrative    m85 - hospice  fpased colon ca,prostate ca        Social History: Alcohol Use Patient uses alcohol, Drinks per occasion: 2, Drinks per w [place]: 0. Smoking Status Patient is a never smoker. Marital Status: , . Lives w ith: spouse. 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 Education/School: has highschool diploma, has college diploma. Jain: [employer].       r  Family History   Problem Relation Age of Onset    Cancer Father        OBJECTIVE:  Visit Vitals    BP (!) 160/101    Pulse 93    Temp 97.5 °F (36.4 °C) (Oral)    Resp 18    Ht 5' 4\" (1.626 m)    Wt 146 lb 1.6 oz (66.3 kg)    SpO2 94%    BMI 25.08 kg/m2     ENT: perrla,  eom intact  NECK: supple. Thyroid normal  CHEST: clear to ascultation and percussion   HEART: regular rate and rhythm  ABD: soft, bowel sounds active  EXTREMITIES: no edema, pulse 1+     No visits with results within 3 Month(s) from this visit.   Latest known visit with results is:    Office Visit on 08/30/2017   Component Date Value Ref Range Status    Glucose 08/30/2017 102* 65 - 99 mg/dL Final    BUN 08/30/2017 16  8 - 27 mg/dL Final    Creatinine 08/30/2017 0.83  0.57 - 1.00 mg/dL Final    GFR est non-AA 08/30/2017 74  >59 mL/min/1.73 Final    GFR est AA 08/30/2017 86  >59 mL/min/1.73 Final    BUN/Creatinine ratio 08/30/2017 19  12 - 28 Final    Sodium 08/30/2017 143  134 - 144 mmol/L Final    Potassium 08/30/2017 4.6  3.5 - 5.2 mmol/L Final    Chloride 08/30/2017 103  96 - 106 mmol/L Final    CO2 08/30/2017 25  18 - 29 mmol/L Final    Calcium 08/30/2017 10.1  8.7 - 10.3 mg/dL Final    Protein, total 08/30/2017 7.8  6.0 - 8.5 g/dL Final    Albumin 08/30/2017 4.8  3.6 - 4.8 g/dL Final    GLOBULIN, TOTAL 08/30/2017 3.0  1.5 - 4.5 g/dL Final    A-G Ratio 08/30/2017 1.6  1.2 - 2.2 Final    Bilirubin, total 08/30/2017 0.6  0.0 - 1.2 mg/dL Final    Alk. phosphatase 08/30/2017 71  39 - 117 IU/L Final    AST (SGOT) 08/30/2017 37  0 - 40 IU/L Final    ALT (SGPT) 08/30/2017 16  0 - 32 IU/L Final    Hemoglobin A1c 08/30/2017 6.0* 4.8 - 5.6 % Final    Comment:          Pre-diabetes: 5.7 - 6.4           Diabetes: >6.4           Glycemic control for adults with diabetes: <7.0      Estimated average glucose 08/30/2017 126  mg/dL Final    WBC 08/30/2017 4.6  3.4 - 10.8 x10E3/uL Final    RBC 08/30/2017 4.85  3.77 - 5.28 x10E6/uL Final    HGB 08/30/2017 14.2  11.1 - 15.9 g/dL Final    HCT 08/30/2017 43.6  34.0 - 46.6 % Final    MCV 08/30/2017 90  79 - 97 fL Final    MCH 08/30/2017 29.3  26.6 - 33.0 pg Final    MCHC 08/30/2017 32.6  31.5 - 35.7 g/dL Final    RDW 08/30/2017 13.9  12.3 - 15.4 % Final    PLATELET 43/04/3866 350  150 - 379 x10E3/uL Final    NEUTROPHILS 08/30/2017 40  % Final    Lymphocytes 08/30/2017 49  % Final    MONOCYTES 08/30/2017 6  % Final    EOSINOPHILS 08/30/2017 4  % Final    BASOPHILS 08/30/2017 1  % Final    ABS. NEUTROPHILS 08/30/2017 1.8  1.4 - 7.0 x10E3/uL Final    Abs Lymphocytes 08/30/2017 2.3  0.7 - 3.1 x10E3/uL Final    ABS. MONOCYTES 08/30/2017 0.3  0.1 - 0.9 x10E3/uL Final    ABS. EOSINOPHILS 08/30/2017 0.2  0.0 - 0.4 x10E3/uL Final    ABS. BASOPHILS 08/30/2017 0.0  0.0 - 0.2 x10E3/uL Final    IMMATURE GRANULOCYTES 08/30/2017 0  % Final    ABS. IMM. GRANS. 08/30/2017 0.0  0.0 - 0.1 x10E3/uL Final          ASSESSMENT:  1. Essential hypertension    2. Type 2 diabetes mellitus without complication, without long-term current use of insulin ([address]    3. Fatty liver    4. Pes anserinus bursitis of left knee      I think the discomfort is related to an anserine bursitis. I think other pathology needs to be excluded. We will get an opinion from orthopaedics.       Blood pressure is up today which she relates to stress particularly around her mother who is in hospice now however we will increase the Cozaar to 100 mg daily. We will check a lipid panels as well as a hemoglobin A1c. She will return to the office in two weeks for a blood pressure and in four months to see me. PLAN:  .  Orders Placed This Encounter    LIPID PANEL    HEMOGLOBIN A1C WITH EAG    REFERRAL TO ORTHOPEDICS    losartan (COZAAR) 100 mg tablet       Follow-up Disposition:  Return in about 2 weeks (around 3/21/2018) for bp check. ATTENTION:   This medical record was transcribed using an electronic medical records system. Although proofread, it may and can contain electronic and spelling errors. Other human spelling and other errors may be present. Corrections may be executed at a later time. Please feel free to contact us for any clarifications as needed.

## 2018-03-07 NOTE — MR AVS SNAPSHOT
[address] [place] [address] [facility] [address] 
[phone] Patient: Hebert Rios MRN: [id number] TDP:3/85/4320 Visit Information Date & Time Provider Department Dept. Phone Encounter #  
 3/7/2018  2:30 PM Kiki Farr [facility] [id number] Follow-up Instructions Return in about 2 weeks (around 3/21/2018) for bp check. Follow-up and Disposition History Upcoming Health Maintenance Date Due COLONOSCOPY 5/21/1970 MICROALBUMIN Q1 2/24/2017 BREAST CANCER SCRN MAMMOGRAM 3/16/2017 OSTEOPOROSIS SCREENING (DEXA) 5/21/2017 MEDICARE YEARLY EXAM 5/21/2017 FOOT EXAM Q1 9/30/2017 LIPID PANEL Q1 2/14/2018 HEMOGLOBIN A1C Q6M 2/28/2018 EYE EXAM RETINAL OR DILATED Q1 9/29/2018 Pneumococcal 65+ Low/Medium Risk (2 of 2 - PPSV23) 3/7/2019 GLAUCOMA SCREENING Q2Y 9/29/2019 DTaP/Tdap/Td series (2 - Td) 2/14/2027 Allergies as of 3/7/2018  Review Complete On: 3/7/2018 By: Mike Ellis MD  
  
 Severity Noted Reaction Type Reactions Pcn [Penicillins]  09/08/2014    Unknown (comments) Current Immunizations  Never Reviewed No immunizations on file. Not reviewed this visit You Were Diagnosed With   
  
 Codes Comments Essential hypertension    -  Primary ICD-10-CM: I10 
ICD-9-CM: 401.9 Type 2 diabetes mellitus without complication, without long-term current use of insulin (HCC)     ICD-10-CM: E11.9 ICD-9-CM: 250.00 Fatty liver     ICD-10-CM: K76.0 ICD-9-CM: 571.8 Pes anserinus bursitis of left knee     ICD-10-CM: M70.52 ICD-9-CM: 726.61 Vitals BP Pulse Temp Resp Height(growth percentile) Weight(growth percentile) (!) 160/101 93 97.5 °F (36.4 °C) (Oral) 18 5' 4\" (1.626 m) 146 lb 1.6 oz (66.3 kg) SpO2 BMI OB Status Smoking Status 94% 25.08 kg/m2 Postmenopausal Never Smoker Vitals History BMI and BSA Data Body Mass Index Body Surface Area 25.08 kg/m 2 1.73 m 2 Preferred Pharmacy Pharmacy Name Phone [facility] [address] [phone] Your Updated Medication List  
  
   
This list is accurate as of 3/7/18  3:52 PM.  Always use your most recent med list.  
  
  
  
  
 clotrimazole-betamethasone topical cream  
Commonly known as:  Collene Michael Apply outside vagina twice a day  
  
 glucose blood VI test strips strip Commonly known as:  CONTOUR NEXT STRIPS Use to test blood sugar once daily. Dx.e11.9 Lancets Misc Commonly known as:  Gia Staff Use to test blood sugar once daily. Dx.e11.9  
  
 losartan 100 mg tablet Commonly known as:  COZAAR Take 1 Tab by mouth daily. metFORMIN  mg tablet Commonly known as:  GLUCOPHAGE XR  
TAKE 1 TABLET BY MOUTH EVERY EVENING WITH SUPPER  
  
 pravastatin 40 mg tablet Commonly known as:  PRAVACHOL  
TAKE 1 TABLET BY MOUTH EVERY EVENING Prescriptions Sent to Pharmacy Refills  
 losartan (COZAAR) 100 mg tablet 11 Sig: Take 1 Tab by mouth daily. Class: Normal  
 Pharmacy: [email or web address] [address] [place] [address] #: [phone] Route: Oral  
  
We Performed the Following COLLECTION VENOUS BLOOD,VENIPUNCTURE [id number] CPT(R)] HEMOGLOBIN A1C WITH EAG [62554 CPT(R)]  DIABETES FOOT EXAM [HM7 Custom] LIPID PANEL [44950 CPT(R)] REFERRAL TO ORTHOPEDICS [TXC945 Custom] Comments:  
 Please evaluate patient for knee pain. Follow-up Instructions Return in about 2 weeks (around 3/21/2018) for bp check. Referral Information Referral ID Referred By Referred To  
  
 [id number] Nita INTERIANO [facility]   
   [address] [place] [address] [facility] [address], [address] Visits Status Start Date End Date 1 New Request 3/7/18 3/7/19 If your referral has a status of pending review or denied, additional information will be sent to support the outcome of this decision. Introducing [facility] & HEALTH SERVICES! Dear Demetrius Lagos: Thank you for requesting a [employer] account. Our records indicate that you already have an active [employer] account. You can access your account anytime at [email or web address] Did you know that you can access your hospital and ER discharge instructions at any time in [employer]? You can also review all of your test results from your hospital stay or ER visit. Additional Information If you have questions, please visit the Frequently Asked Questions section of the [employer] website at [email or web address] Remember, [employer] is NOT to be used for urgent needs. For medical emergencies, dial 911. Now available from your iPhone and Android! Please provide this summary of care documentation to your next provider. Your primary care clinician is listed as Lord Caba. If you have any questions after today's visit, please call [phone].

## 2018-03-07 NOTE — PROGRESS NOTES
1. Have you been to the ER, urgent care clinic since your last visit? Hospitalized since your last visit? No    2. Have you seen or consulted any other health care providers outside of the [address] [place] [address] since your last visit? Include any pap smears or colon screening.  No     Complaints of pain in left knee

## 2018-03-07 NOTE — ASSESSMENT & PLAN NOTE
Stable, based on history, physical exam and review of pertinent labs, studies and medications; meds reconciled; continue current treatment plan.   Key Antihyperglycemic Medications             metFORMIN ER (GLUCOPHAGE XR) 500 mg tablet  (Taking) TAKE 1 TABLET BY MOUTH EVERY EVENING WITH SUPPER        Other Key Diabetic Medications             pravastatin (PRAVACHOL) 40 mg tablet  (Taking) TAKE 1 TABLET BY MOUTH EVERY EVENING    losartan (COZAAR) 50 mg tablet  (Taking) TAKE 1 TABLET BY MOUTH EVERY DAY        Lab Results   Component Value Date/Time    Hemoglobin A1c 6.0 08/30/2017 11:35 AM    Glucose 102 08/30/2017 11:35 AM    Creatinine 0.83 08/30/2017 11:35 AM    Cholesterol, total 204 02/14/2017 02:50 PM    HDL Cholesterol 59 02/14/2017 02:50 PM    LDL, calculated 116 02/14/2017 02:50 PM    Triglyceride 144 02/14/2017 02:50 PM     Diabetic Foot and Eye Exam HM Status   Topic Date Due    Diabetic Foot Care  09/30/2017    Eye Exam  09/29/2018

## 2018-03-08 LAB
CHOLEST SERPL-MCNC: 189 MG/DL (ref 100–199)
EST. AVERAGE GLUCOSE BLD GHB EST-MCNC: 134 MG/DL
HBA1C MFR BLD: 6.3 % (ref 4.8–5.6)
HDLC SERPL-MCNC: 58 MG/DL
LDLC SERPL CALC-MCNC: 111 MG/DL (ref 0–99)
TRIGL SERPL-MCNC: 99 MG/DL (ref 0–149)
VLDLC SERPL CALC-MCNC: 20 MG/DL (ref 5–40)

## 2018-04-25 ENCOUNTER — OFFICE VISIT (OUTPATIENT)
Dept: INTERNAL MEDICINE CLINIC | Age: 68
End: 2018-04-25

## 2018-04-25 VITALS
HEIGHT: 64 IN | DIASTOLIC BLOOD PRESSURE: 89 MMHG | OXYGEN SATURATION: 95 % | SYSTOLIC BLOOD PRESSURE: 147 MMHG | WEIGHT: 144.8 LBS | BODY MASS INDEX: 24.72 KG/M2 | TEMPERATURE: 97.4 F | RESPIRATION RATE: 18 BRPM | HEART RATE: 93 BPM

## 2018-04-25 DIAGNOSIS — Z12.31 ENCOUNTER FOR SCREENING MAMMOGRAM FOR MALIGNANT NEOPLASM OF BREAST: ICD-10-CM

## 2018-04-25 DIAGNOSIS — E11.9 TYPE 2 DIABETES MELLITUS WITHOUT COMPLICATION, WITHOUT LONG-TERM CURRENT USE OF INSULIN (HCC): ICD-10-CM

## 2018-04-25 DIAGNOSIS — I10 ESSENTIAL HYPERTENSION: ICD-10-CM

## 2018-04-25 DIAGNOSIS — Z63.4 BEREAVEMENT: ICD-10-CM

## 2018-04-25 DIAGNOSIS — M70.52 PES ANSERINUS BURSITIS OF LEFT KNEE: Primary | ICD-10-CM

## 2018-04-25 SDOH — SOCIAL STABILITY - SOCIAL INSECURITY: DISSAPEARANCE AND DEATH OF FAMILY MEMBER: Z63.4

## 2018-04-25 NOTE — MR AVS SNAPSHOT
[address] [place] [address] [facility] [address] [address] 
[phone] Patient: Wilhelm Aschoff MRN: [id number] WWW:[birth year] Visit Information Date & Time Provider Department Dept. Phone Encounter #  
 4/25/2018  3:45 PM Clara Grace MD [facility] Sports Medicine and Primary Care [phone] [id number] Follow-up Instructions Return in about 3 months (around 7/25/2018). Upcoming Health Maintenance Date Due MICROALBUMIN Q1 2/24/2017 BREAST CANCER SCRN MAMMOGRAM 3/16/2017 Bone Densitometry (Dexa) Screening 5/21/2017 MEDICARE YEARLY EXAM 3/14/2018 HEMOGLOBIN A1C Q6M 9/7/2018 EYE EXAM RETINAL OR DILATED Q1 9/29/2018 Pneumococcal 65+ Low/Medium Risk (2 of 2 - PPSV23) 3/7/2019 FOOT EXAM Q1 3/7/2019 LIPID PANEL Q1 3/7/2019 GLAUCOMA SCREENING Q2Y 9/29/2019 COLONOSCOPY 6/15/2024 DTaP/Tdap/Td series (2 - Td) 2/14/2027 Allergies as of 4/25/2018  Review Complete On: 4/25/2018 By: Dorinda Thompson LPN Severity Noted Reaction Type Reactions Pcn [Penicillins]  09/08/2014    Unknown (comments) Current Immunizations  Never Reviewed No immunizations on file. Not reviewed this visit You Were Diagnosed With   
  
 Codes Comments Pes anserinus bursitis of left knee    -  Primary ICD-10-CM: M70.52 ICD-9-CM: 726.61 Type 2 diabetes mellitus without complication, without long-term current use of insulin (HCC)     ICD-10-CM: E11.9 ICD-9-CM: 250.00 Essential hypertension     ICD-10-CM: I10 
ICD-9-CM: 401.9 Bereavement     ICD-10-CM: Z63.4 ICD-9-CM: V62.82 Encounter for screening mammogram for malignant neoplasm of breast     ICD-10-CM: Z12.31 
ICD-9-CM: V76.12 Vitals BP Pulse Temp Resp Height(growth percentile) Weight(growth percentile) 147/89 93 97.4 °F (36.3 °C) (Oral) 18 5' 4\" (1.626 m) 144 lb 12.8 oz (65.7 kg) SpO2 BMI OB Status Smoking Status 95% 24.85 kg/m2 Postmenopausal Never Smoker Vitals History BMI and BSA Data Body Mass Index Body Surface Area  
 24.85 kg/m 2 1.72 m 2 Preferred Pharmacy Pharmacy Name Phone [facility]  [phone] Your Updated Medication List  
  
   
This list is accurate as of 4/25/18  5:23 PM.  Always use your most recent med list.  
  
  
  
  
 clotrimazole-betamethasone topical cream  
Commonly known as:  Veronique Gull Apply outside vagina twice a day  
  
 glucose blood VI test strips strip Commonly known as:  CONTOUR NEXT TEST STRIPS Use to test blood sugar once daily. Dx.e11.9 Lancets Misc Commonly known as:  Derald Jennifer Use to test blood sugar once daily. Dx.e11.9  
  
 losartan 100 mg tablet Commonly known as:  COZAAR Take 1 Tab by mouth daily. metFORMIN  mg tablet Commonly known as:  GLUCOPHAGE XR  
TAKE 1 TABLET BY MOUTH EVERY EVENING WITH SUPPER  
  
 pravastatin 40 mg tablet Commonly known as:  PRAVACHOL  
TAKE 1 TABLET BY MOUTH EVERY EVENING We Performed the Following MICROALBUMIN, UR, RAND W/ MICROALB/CREAT RATIO [id number] CPT(R)] REFERRAL TO ORTHOPEDICS [WRI348 Custom] Comments:  
 Please evaluate patient for bursitis. Follow-up Instructions Return in about 3 months (around 7/25/2018). To-Do List   
 04/25/2018 Imaging:  [facility] BI SCREENING INCL CAD Referral Information Referral ID Referred By Referred To  
  
 [id number] Shantal Arevalo MD   
   [address] Phone: [phone] Fax: [phone] Visits Status Start Date End Date 1 New Request 4/25/18 4/25/19 If your referral has a status of pending review or denied, additional information will be sent to support the outcome of this decision. Introducing [facility] & HEALTH SERVICES! Dear Patricia Washburn: Thank you for requesting a [employer] account. Our records indicate that you already have an active [employer] account. You can access your account anytime at [email or web address] Did you know that you can access your hospital and ER discharge instructions at any time in [employer]? You can also review all of your test results from your hospital stay or ER visit. Additional Information If you have questions, please visit the Frequently Asked Questions section of the [employer] website at [email or web address] Remember, [employer] is NOT to be used for urgent needs. For medical emergencies, dial 911. Now available from your iPhone and Android! Please provide this summary of care documentation to your next provider. Your primary care clinician is listed as Amalia Caban. If you have any questions after today's visit, please call [phone].

## 2018-04-25 NOTE — PROGRESS NOTES
SPORTS MEDICINE AND PRIMARY CARE  Jessica Redman MD, [place], [address] [address]  Phone:  [phone]  Fax: [phone]      Chief Complaint   Patient presents with    Knee Pain     f/u         SUBECTIVE:    Aicha Castillo is a 74 y.o. female Patient returns today with known history of diabetes, bursitis of the knee, fatty liver, primary hypertension and since we last saw her she saw orthopedics, who told her she had pes anserinus bursitis of the left knee. A steroid injection was not performed. She prefers not to see that orthopedic doctor again. Since we last saw her her mother passed last month with  and congestive heart failure. She was in hospice. Patient states that her  tells her she's been in a  mood, which is maybe the reason she doesn't want to see that orthopedic doctor again. Current Outpatient Prescriptions   Medication Sig Dispense Refill    losartan (COZAAR) 100 mg tablet Take 1 Tab by mouth daily. 90 Tab 11    pravastatin (PRAVACHOL) 40 mg tablet TAKE 1 TABLET BY MOUTH EVERY EVENING 90 Tab 0    Lancets (MICROLET LANCET) misc Use to test blood sugar once daily. Dx.e11.9 100 Each 11    glucose blood VI test strips (CONTOUR NEXT STRIPS) strip Use to test blood sugar once daily. Dx.e11.9 50 Strip 11    metFORMIN ER (GLUCOPHAGE XR) 500 mg tablet TAKE 1 TABLET BY MOUTH EVERY EVENING WITH SUPPER 90 Tab 11    clotrimazole-betamethasone (LOTRISONE) topical cream Apply outside vagina twice a day 15 g 0     Past Medical History:   Diagnosis Date    Bereavement 2018    mother  copd,chf    Bursitis of knee     Chest pain     Diabetes ([address]     Fatty liver     Gall bladder stones     Hypertension     LBP (low back pain)     Pes anserinus bursitis of left knee     S/P colonoscopy     Tenosynovitis of thumb 16     History reviewed. No pertinent surgical history.   Allergies   Allergen Reactions    Pcn [Penicillins] Unknown (comments) REVIEW OF SYSTEMS:   No other joint discomfort except for the knee. Social History     Social History    Marital status:      Spouse name: N/A    Number of children: N/A    Years of education: N/A     Social History Main Topics    Smoking status: Never Smoker    Smokeless tobacco: Never Used    Alcohol use No    Drug use: No    Sexual activity: Yes     Partners: Male     Other Topics Concern    None     Social History Narrative    m85  chf copd  fpased colon ca,prostate ca        Social History: Alcohol Use Patient uses alcohol, Drinks per occasion: 2, Drinks per w [place]: 0. Smoking Status Patient is a never smoker. Marital Status: , . Lives w ith: spouse. 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 Education/School: has highschool diploma, has college diploma. Taoism: Sanjana Dillard.       r  Family History   Problem Relation Age of Onset    Cancer Father        OBJECTIVE:  Visit Vitals    /89    Pulse 93    Temp 97.4 °F (36.3 °C) (Oral)    Resp 18    Ht 5' 4\" (1.626 m)    Wt 144 lb 12.8 oz (65.7 kg)    SpO2 95%    BMI 24.85 kg/m2     ENT: perrla,  eom intact  NECK: supple.  Thyroid normal  CHEST: clear to ascultation and percussion   HEART: regular rate and rhythm  ABD: soft, bowel sounds active  EXTREMITIES: no edema, pulse 1+     Office Visit on 2018   Component Date Value Ref Range Status    Cholesterol, total 2018 189  100 - 199 mg/dL Final    Triglyceride 2018 99  0 - 149 mg/dL Final    HDL Cholesterol 2018 58  >39 mg/dL Final    VLDL, calculated 2018 20  5 - 40 mg/dL Final    LDL, calculated 2018 111* 0 - 99 mg/dL Final    Hemoglobin A1c 2018 6.3* 4.8 - 5.6 % Final    Comment:          Pre-diabetes: 5.7 - 6.4           Diabetes: >6.4           Glycemic control for adults with diabetes: <7.0      Estimated average glucose 2018 134  mg/dL Final ASSESSMENT:  1. Pes anserinus bursitis of left knee    2. Type 2 diabetes mellitus without complication, without long-term current use of insulin ([address]    3. Essential hypertension    4. Bereavement    5. Encounter for screening mammogram for malignant neoplasm of breast       Will refer her to orthopedics for the bursitis of the knee as Dr. Avila Sawyer advised her if the steroid injection didn't work she'd need a TKR, which she prefers not to have and would like a second orthopedic opinion. Blood sugar control was checked last month and was adequate. Patient is at her ideal body weight. Her blood pressure is a little higher than we'd like to see it, but adjustment will not be made . She'll return to the office in three months. At that time if it's greater than 130/80 we will make an adjustment in medication. She is agreeable with the plan. PLAN:  .  Orders Placed This Encounter    [facility] MAMMO BI SCREENING INCL CAD    MICROALBUMIN, UR, RAND W/ MICROALB/CREAT RATIO    REFERRAL TO ORTHOPEDICS       Follow-up Disposition:  Return in about 3 months (around 7/25/2018). ATTENTION:   This medical record was transcribed using an electronic medical records system. Although proofread, it may and can contain electronic and spelling errors. Other human spelling and other errors may be present. Corrections may be executed at a later time. Please feel free to contact us for any clarifications as needed.

## 2018-04-25 NOTE — PROGRESS NOTES
1. Have you been to the ER, urgent care clinic since your last visit? Hospitalized since your last visit? No    2. Have you seen or consulted any other health care providers outside of the [facility] since your last visit? Include any pap smears or colon screening.  No     Wants to discuss her left knee

## 2018-07-09 RX ORDER — DICLOFENAC SODIUM 10 MG/G
GEL TOPICAL 4 TIMES DAILY
Qty: 100 G | Refills: 6 | Status: SHIPPED | OUTPATIENT
Start: 2018-07-09 | End: 2018-08-01 | Stop reason: SDUPTHER

## 2018-08-01 ENCOUNTER — OFFICE VISIT (OUTPATIENT)
Dept: INTERNAL MEDICINE CLINIC | Age: 68
End: 2018-08-01

## 2018-08-01 VITALS
DIASTOLIC BLOOD PRESSURE: 84 MMHG | RESPIRATION RATE: 18 BRPM | OXYGEN SATURATION: 98 % | HEIGHT: 64 IN | HEART RATE: 83 BPM | SYSTOLIC BLOOD PRESSURE: 143 MMHG | TEMPERATURE: 97.9 F | WEIGHT: 140.5 LBS | BODY MASS INDEX: 23.99 KG/M2

## 2018-08-01 DIAGNOSIS — Z13.31 SCREENING FOR DEPRESSION: ICD-10-CM

## 2018-08-01 DIAGNOSIS — E11.9 TYPE 2 DIABETES MELLITUS WITHOUT COMPLICATION, WITHOUT LONG-TERM CURRENT USE OF INSULIN (HCC): ICD-10-CM

## 2018-08-01 DIAGNOSIS — Z00.00 MEDICARE ANNUAL WELLNESS VISIT, SUBSEQUENT: Primary | ICD-10-CM

## 2018-08-01 DIAGNOSIS — Z12.31 ENCOUNTER FOR SCREENING MAMMOGRAM FOR MALIGNANT NEOPLASM OF BREAST: ICD-10-CM

## 2018-08-01 DIAGNOSIS — K76.0 FATTY LIVER: ICD-10-CM

## 2018-08-01 DIAGNOSIS — I10 ESSENTIAL HYPERTENSION: ICD-10-CM

## 2018-08-01 DIAGNOSIS — Z78.0 POST-MENOPAUSE: ICD-10-CM

## 2018-08-01 DIAGNOSIS — Z13.39 SCREENING FOR ALCOHOLISM: ICD-10-CM

## 2018-08-01 DIAGNOSIS — M70.52 PES ANSERINUS BURSITIS OF LEFT KNEE: ICD-10-CM

## 2018-08-01 RX ORDER — NAPROXEN 500 MG/1
500 TABLET ORAL 2 TIMES DAILY WITH MEALS
Qty: 60 TAB | Refills: 3 | Status: SHIPPED | OUTPATIENT
Start: 2018-08-01 | End: 2018-08-01 | Stop reason: SDUPTHER

## 2018-08-01 RX ORDER — NAPROXEN 500 MG/1
TABLET ORAL
Qty: 180 TAB | Refills: 3 | Status: SHIPPED | OUTPATIENT
Start: 2018-08-01 | End: 2019-10-03

## 2018-08-01 NOTE — PROGRESS NOTES
1. Have you been to the ER, urgent care clinic since your last visit? Hospitalized since your last visit? No 
 
2. Have you seen or consulted any other health care providers outside of the [address] [place] [address] since your last visit? Include any pap smears or colon screening. No  
 
Swelling in left knee This is the Subsequent Medicare Annual Wellness Exam, performed 12 months or more after the Initial AWV or the last Subsequent AWV I have reviewed the patient's medical history in detail and updated the computerized patient record. History Past Medical History:  
Diagnosis Date  Bereavement 2018  
 mother  copd,chf  
 Bursitis of knee  Chest pain  Diabetes ([address]  Fatty liver  Gall bladder stones  Hypertension  LBP (low back pain)  Pes anserinus bursitis of left knee  S/P colonoscopy   Tenosynovitis of thumb 16 History reviewed. No pertinent surgical history. Current Outpatient Prescriptions Medication Sig Dispense Refill  naproxen (NAPROSYN) 500 mg tablet Take 1 Tab by mouth two (2) times daily (with meals). 60 Tab 3  
 metFORMIN ER (GLUCOPHAGE XR) 500 mg tablet TAKE 1 TABLET BY MOUTH EVERY EVENING WITH SUPPER 90 Tab 11  
 pravastatin (PRAVACHOL) 40 mg tablet TAKE 1 TABLET BY MOUTH EVERY EVENING 90 Tab 11  
 losartan (COZAAR) 100 mg tablet Take 1 Tab by mouth daily. 90 Tab 11  Lancets (MICROLET LANCET) misc Use to test blood sugar once daily. Dx.e11.9 100 Each 11  
 glucose blood VI test strips (CONTOUR NEXT STRIPS) strip Use to test blood sugar once daily. Dx.e11.9 50 Strip 11  
 clotrimazole-betamethasone (LOTRISONE) topical cream Apply outside vagina twice a day 15 g 0 Allergies Allergen Reactions  Pcn [Penicillins] Unknown (comments) Family History Problem Relation Age of Onset  Cancer Father Social History Substance Use Topics  Smoking status: Never Smoker  Smokeless tobacco: Never Used  
 Alcohol use No  
 
Patient Active Problem List  
Diagnosis Code  Hypertension I10  
 Diabetes ([facility] E11.9  Fatty liver K76.0  LBP (low back pain) M54.5  Gall bladder stones K80.20  Tenosynovitis of thumb M65.9  Chest pain R07.9  Bursitis of knee M70.50  Pes anserinus bursitis of left knee M70.52  Bereavement Z63.4 Depression Risk Factor Screening: PHQ over the last two weeks 8/1/2018 PHQ Not Done - Little interest or pleasure in doing things Not at all Feeling down, depressed, irritable, or hopeless Not at all Total Score PHQ 2 0 Alcohol Risk Factor Screening: You do not drink alcohol or very rarely. Functional Ability and Level of Safety:  
Hearing Loss Hearing is good. Activities of Daily Living The home contains: no safety equipment. Patient does total self care Fall Risk Fall Risk Assessment, last 12 mths 8/1/2018 Able to walk? Yes Fall in past 12 months? No  
 
 
Abuse Screen Patient is not abused Cognitive Screening Evaluation of Cognitive Function: 
Has your family/caregiver stated any concerns about your memory: no 
Normal 
 
Patient Care Team  
Patient Care Team: 
Sumeet Winters MD as PCP - General (Internal Medicine) Assessment/Plan Education and counseling provided: 
Are appropriate based on today's review and evaluation Diagnoses and all orders for this visit: 
 
1. Medicare annual wellness visit, subsequent 2. Screening for alcoholism -     Annual  Alcohol Screen 15 min () 3. Screening for depression -     Depression Screen Annual 
 
4. Type 2 diabetes mellitus without complication, without long-term current use of insulin ([facility] -     MICROALBUMIN, UR, RAND W/ MICROALB/CREAT RATIO 
-     URINALYSIS W/ RFLX MICROSCOPIC 
-     CBC WITH AUTOMATED DIFF 
-     METABOLIC PANEL, COMPREHENSIVE 
-     LIPID PANEL 
-     TSH 3RD GENERATION 
-     COLLECTION VENOUS BLOOD,VENIPUNCTURE 
-     HEMOGLOBIN A1C WITH EAG 5. Pes anserinus bursitis of left knee 6. Essential hypertension -     [facility] MAMMO BI SCREENING INCL CAD; Future 7. Fatty liver 8. Encounter for screening mammogram for malignant neoplasm of breast  
-     [facility] MAMMO BI SCREENING INCL CAD; Future 9. Post-menopause -     DEXA BONE DENSITY APPENDICULAR; Future Other orders 
-     naproxen (NAPROSYN) 500 mg tablet; Take 1 Tab by mouth two (2) times daily (with meals). Health Maintenance Due Topic Date Due  
 MICROALBUMIN Q1  2017  BREAST CANCER SCRN MAMMOGRAM  2017  Bone Densitometry (Dexa) Screening  2017  MEDICARE YEARLY EXAM  2018  Influenza Age 5 to Adult  2018 [facility] Gaby Flor MD, [address] [address] Phone:  [phone]  Fax: [phone] Chief Complaint Patient presents with Rui Recinos Annual Wellness Visit SUBECTIVE: 
 
[facility] is a 79 y.o. female  Dictation on: 2018  5:13 PM by: Azalea Black [[id number]] Current Outpatient Prescriptions Medication Sig Dispense Refill  naproxen (NAPROSYN) 500 mg tablet Take 1 Tab by mouth two (2) times daily (with meals). 60 Tab 3  
 metFORMIN ER (GLUCOPHAGE XR) 500 mg tablet TAKE 1 TABLET BY MOUTH EVERY EVENING WITH SUPPER 90 Tab 11  
 pravastatin (PRAVACHOL) 40 mg tablet TAKE 1 TABLET BY MOUTH EVERY EVENING 90 Tab 11  
 losartan (COZAAR) 100 mg tablet Take 1 Tab by mouth daily. 90 Tab 11  Lancets (MICROLET LANCET) misc Use to test blood sugar once daily. Dx.e11.9 100 Each 11  
 glucose blood VI test strips (CONTOUR NEXT STRIPS) strip Use to test blood sugar once daily. Dx.e11.9 50 Strip 11  
 clotrimazole-betamethasone (LOTRISONE) topical cream Apply outside vagina twice a day 15 g 0 Past Medical History:  
Diagnosis Date  Bereavement 2018  
 mother  copd,chf  
 Bursitis of knee  Chest pain  Diabetes ([address]  Fatty liver  Gall bladder stones  Hypertension  LBP (low back pain)  Pes anserinus bursitis of left knee  S/P colonoscopy   Tenosynovitis of thumb 16 History reviewed. No pertinent surgical history. Allergies Allergen Reactions  Pcn [Penicillins] Unknown (comments) REVIEW OF SYSTEMS: 
  Dictation on: 2018  5:14 PM by: Sandrita Cherry [[id number]] Social History Social History  Marital status:  Spouse name: N/A  
 Number of children: N/A  
 Years of education: N/A Social History Main Topics  Smoking status: Never Smoker  Smokeless tobacco: Never Used  Alcohol use No  
 Drug use: No  
 Sexual activity: Yes  
  Partners: Male Other Topics Concern  None Social History Narrative  
 m85  chf copd  fpased colon ca,prostate ca Social History: Alcohol Use Patient uses alcohol, Drinks per occasion: 2, Drinks per w Craig: 0. Smoking Status Patient is a never smoker. Marital Status: , . Lives w ith: spouse. 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 Education/School: has highschool diploma, has college diploma. Sikhism: [place].  
   
r 
Family History Problem Relation Age of Onset  Cancer Father OBJECTIVE: 
Visit Vitals  /84  Pulse 83  Temp 97.9 °F (36.6 °C) (Oral)  Resp 18  Ht 5' 4\" (1.626 m)  Wt 140 lb 8 oz (63.7 kg)  SpO2 98%  BMI 24.12 kg/m2 ENT: perrla,  eom intact NECK: supple. Thyroid normal 
CHEST: clear to ascultation and percussion HEART: regular rate and rhythm ABD: soft, bowel sounds active EXTREMITIES: no edema, pulse 1+ No visits with results within 3 Month(s) from this visit. Latest known visit with results is: 
 
Office Visit on 2018 Component Date Value Ref Range Status  Cholesterol, total 2018 189  100 - 199 mg/dL Final  
 Triglyceride 2018 99  0 - 149 mg/dL Final  
 HDL Cholesterol 03/07/2018 58  >39 mg/dL Final  
 VLDL, calculated 03/07/2018 20  5 - 40 mg/dL Final  
 LDL, calculated 03/07/2018 111* 0 - 99 mg/dL Final  
 Hemoglobin A1c 03/07/2018 6.3* 4.8 - 5.6 % Final  
 Comment:          Pre-diabetes: 5.7 - 6.4 Diabetes: >6.4 Glycemic control for adults with diabetes: <7.0  Estimated average glucose 03/07/2018 134  mg/dL Final  
 
  
 
ASSESSMENT: 
1. Medicare annual wellness visit, subsequent 2. Screening for alcoholism 3. Screening for depression 4. Type 2 diabetes mellitus without complication, without long-term current use of insulin ([facility] 5. Pes anserinus bursitis of left knee 6. Essential hypertension 7. Fatty liver 8. Encounter for screening mammogram for malignant neoplasm of breast    
9. Post-menopause Dictation on: 08/01/2018  5:20 PM by: May Daugherty [[id number]] I have discussed the diagnosis with the patient and the intended plan as seen in the 
orders above. The patient understands and agees with the plan. The patient has  
received an after visit summary and questions were answered concerning 
future plans Patient labs and/or xrays were reviewed Past records were reviewed. PLAN: 
. Orders Placed This Encounter  Depression Screen Annual  
 CHARI MAMMO BI SCREENING INCL CAD  DEXA BONE DENSITY APPENDICULAR  
 MICROALBUMIN, UR, RAND W/ MICROALB/CREAT RATIO  URINALYSIS W/ RFLX MICROSCOPIC  CBC WITH AUTOMATED DIFF  
 METABOLIC PANEL, COMPREHENSIVE  LIPID PANEL  
 TSH 3RD GENERATION  
 HEMOGLOBIN A1C WITH EAG  
 naproxen (NAPROSYN) 500 mg tablet Follow-up Disposition: 
Return in about 4 months (around 12/1/2018). ATTENTION:  
This medical record was transcribed using an electronic medical records system. Although proofread, it may and can contain electronic and spelling errors. Other human spelling and other errors may be present. Corrections may be executed at a later time. Please feel free to contact us for any clarifications as needed.

## 2018-08-01 NOTE — PATIENT INSTRUCTIONS
Medicare Wellness Visit, Female The best way to live healthy is to have a lifestyle where you eat a well-balanced diet, exercise regularly, limit alcohol use, and quit all forms of tobacco/nicotine, if applicable. Regular preventive services are another way to keep healthy. Preventive services (vaccines, screening tests, monitoring & exams) can help personalize your care plan, which helps you manage your own care. Screening tests can find health problems at the earliest stages, when they are easiest to treat. [address] [facility] follows the current, evidence-based guidelines published by the [facility] Sanjay Sindhu ([facility]) when recommending preventive services for our patients. Because we follow these guidelines, sometimes recommendations change over time as research supports it. (For example, mammograms used to be recommended annually. Even though Medicare will still pay for an annual mammogram, the newer guidelines recommend a mammogram every two years for women of average risk.) Of course, you and your provider may decide to screen more often for some diseases, based on your risk and co-morbidities (chronic disease you are already diagnosed with). Preventive services for you include: - Medicare offers their members a free annual wellness visit, which is time for you and your primary care provider to discuss and plan for your preventive service needs. Take advantage of this benefit every year! 
 
-All people over age 72 should receive the recommended pneumonia vaccines. Current USPSTF guidelines recommend a series of two vaccines for the best pneumonia protection.  
 
-All adults should have a yearly flu vaccine and a tetanus vaccine every 10 years. All adults age 61 years should receive a shingles vaccine once in their lifetime.   
 
-A bone mass density test is recommended when a woman turns 65 to screen for osteoporosis.  This test is only recommended once as a screening. Some providers will use this same test as a disease monitoring tool if you already have osteoporosis. -All adults age 38-68 years who are overweight should have a diabetes screening test once every three years.  
 
-Other screening tests & preventive services for persons with diabetes include: an eye exam to screen for diabetic retinopathy, a kidney function test, a foot exam, and stricter control over your cholesterol.  
 
-Cardiovascular screening for adults with routine risk involves an electrocardiogram (ECG) at intervals determined by the provider.  
 
-Colorectal cancer screenings should be done for adults age 54-65 years with normal risk. There are a number of acceptable methods of screening for this type of cancer. Each test has its own benefits and drawbacks. Discuss with your provider what is most appropriate for you during your annual wellness visit. The different tests include: colonoscopy (considered the best screening method), a fecal occult blood test, a fecal DNA test, and sigmoidoscopy. -Breast cancer screenings are recommended every other year for women of normal risk age 54-69 years.  
 
-Cervical cancer screenings for women over age 72 are only recommended with certain risk factors.  
 
-All adults born between [facility] should be screened once for Hepatitis C. Here is a list of your current Health Maintenance items (your personalized list of preventive services) with a due date: 
Health Maintenance Due Topic Date Due  
 Albumin Urine Test  02/24/2017  Breast Cancer Screening  03/16/2017  Bone Mineral Density   05/21/2017 [place] Annual Well Visit  03/14/2018  Flu Vaccine  08/01/2018

## 2018-08-01 NOTE — MR AVS SNAPSHOT
[address] [facility] [address] 
[phone] Patient: Rick Fallon MRN: [id number] Visit Information Date & Time Provider Department Dept. Phone Encounter #  
 8/1/2018  3:45 PM Kiki Orlando [facility] [phone] [id number] Follow-up Instructions Return in about 4 months (around 12/1/2018). Follow-up and Disposition History Your Appointments 12/11/2018  3:30 PM  
Any with Romelia Villalta MD  
[facility] ([address]) Appt Note: 4 Month Follow up [address], [place] [address] [facility] [address]  
  
   
 [address], [place] [address] Upcoming Health Maintenance Date Due MICROALBUMIN Q1 2/24/2017 BREAST CANCER SCRN MAMMOGRAM 3/16/2017 Bone Densitometry (Dexa) Screening 5/21/2017 MEDICARE YEARLY EXAM 3/14/2018 Influenza Age 5 to Adult 8/1/2018 HEMOGLOBIN A1C Q6M 9/7/2018 EYE EXAM RETINAL OR DILATED Q1 9/29/2018 Pneumococcal 65+ Low/Medium Risk (2 of 2 - PPSV23) 3/7/2019 FOOT EXAM Q1 3/7/2019 LIPID PANEL Q1 3/7/2019 GLAUCOMA SCREENING Q2Y 9/29/2019 COLONOSCOPY 6/15/2024 DTaP/Tdap/Td series (2 - Td) 2/14/2027 Allergies as of 8/1/2018  Review Complete On: 8/1/2018 By: Romelia Villalta MD  
  
 Severity Noted Reaction Type Reactions Pcn [Penicillins]  09/08/2014    Unknown (comments) Current Immunizations  Never Reviewed No immunizations on file. Not reviewed this visit You Were Diagnosed With   
  
 Codes Comments Medicare annual wellness visit, subsequent    -  Primary ICD-10-CM: Z00.00 ICD-9-CM: V70.0 Screening for alcoholism     ICD-10-CM: Z13.89 ICD-9-CM: V79.1 Screening for depression     ICD-10-CM: Z13.89 ICD-9-CM: V79.0  Type 2 diabetes mellitus without complication, without long-term current use of insulin ([facility]     ICD-10-CM: E11.9 ICD-9-CM: 250.00 Pes anserinus bursitis of left knee     ICD-10-CM: M70.52 ICD-9-CM: 726.61 Essential hypertension     ICD-10-CM: I10 
ICD-9-CM: 401.9 Fatty liver     ICD-10-CM: K76.0 ICD-9-CM: 571.8 Encounter for screening mammogram for malignant neoplasm of breast     ICD-10-CM: Z12.31 
ICD-9-CM: V76.12 Post-menopause     ICD-10-CM: Z78.0 ICD-9-CM: V49.81 Vitals BP Pulse Temp Resp Height(growth percentile) Weight(growth percentile) 143/84 83 97.9 °F (36.6 °C) (Oral) 18 5' 4\" (1.626 m) 140 lb 8 oz (63.7 kg) SpO2 BMI OB Status Smoking Status 98% 24.12 kg/m2 Postmenopausal Never Smoker Vitals History BMI and BSA Data Body Mass Index Body Surface Area  
 24.12 kg/m 2 1.7 m 2 Preferred Pharmacy Pharmacy Name Phone [address] [facility] AT [address] [place] [address]. [phone] Your Updated Medication List  
  
   
This list is accurate as of 8/1/18  5:27 PM.  Always use your most recent med list.  
  
  
  
  
 clotrimazole-betamethasone topical cream  
Commonly known as:  Beatty Scriver Apply outside vagina twice a day  
  
 glucose blood VI test strips strip Commonly known as:  CONTOUR NEXT TEST STRIPS Use to test blood sugar once daily. Dx.e11.9 Lancets Misc Commonly known as:  Catheryn Goo Use to test blood sugar once daily. Dx.e11.9  
  
 losartan 100 mg tablet Commonly known as:  COZAAR Take 1 Tab by mouth daily. metFORMIN  mg tablet Commonly known as:  GLUCOPHAGE XR  
TAKE 1 TABLET BY MOUTH EVERY EVENING WITH SUPPER  
  
 naproxen 500 mg tablet Commonly known as:  NAPROSYN Take 1 Tab by mouth two (2) times daily (with meals). pravastatin 40 mg tablet Commonly known as:  PRAVACHOL  
TAKE 1 TABLET BY MOUTH EVERY EVENING Prescriptions Sent to Pharmacy Refills naproxen (NAPROSYN) 500 mg tablet 3 Sig: Take 1 Tab by mouth two (2) times daily (with meals). Class: Normal  
 Pharmacy: [facility] [address], [address] [place] [address] AT [address] [facility] [address].  #: [phone] Route: Oral  
  
We Performed the Following CBC WITH AUTOMATED DIFF [07539 CPT(R)] COLLECTION VENOUS BLOOD,VENIPUNCTURE [id number] CPT(R)] [address] [[id number] HCPCS] HEMOGLOBIN A1C WITH EAG [56718 CPT(R)] LIPID PANEL [46166 CPT(R)] METABOLIC PANEL, COMPREHENSIVE [47853 CPT(R)] MICROALBUMIN, UR, RAND W/ MICROALB/CREAT RATIO [id number] CPT(R)] CO ANNUAL ALCOHOL SCREEN 15 MIN [id number] HCPCS] TSH 3RD GENERATION [32144 CPT(R)] URINALYSIS W/ RFLX MICROSCOPIC [63373 CPT(R)] Follow-up Instructions Return in about 4 months (around 12/1/2018). To-Do List   
 08/01/2018 Imaging:  DEXA BONE DENSITY APPENDICULAR   
  
 08/01/2018 Imaging:  [facility] MAMMO BI SCREENING INCL CAD Patient Instructions Medicare Wellness Visit, Female The best way to live healthy is to have a lifestyle where you eat a well-balanced diet, exercise regularly, limit alcohol use, and quit all forms of tobacco/nicotine, if applicable. Regular preventive services are another way to keep healthy. Preventive services (vaccines, screening tests, monitoring & exams) can help personalize your care plan, which helps you manage your own care. Screening tests can find health problems at the earliest stages, when they are easiest to treat. [facility] follows the current, evidence-based guidelines published by the [place] Sanjay Sindhu (USPSTF) when recommending preventive services for our patients. Because we follow these guidelines, sometimes recommendations change over time as research supports it. (For example, mammograms used to be recommended annually. Even though Medicare will still pay for an annual mammogram, the newer guidelines recommend a mammogram every two years for women of average risk.) Of course, you and your provider may decide to screen more often for some diseases, based on your risk and co-morbidities (chronic disease you are already diagnosed with). Preventive services for you include: - Medicare offers their members a free annual wellness visit, which is time for you and your primary care provider to discuss and plan for your preventive service needs. Take advantage of this benefit every year! 
 
-All people over age 72 should receive the recommended pneumonia vaccines. Current USPSTF guidelines recommend a series of two vaccines for the best pneumonia protection.  
 
-All adults should have a yearly flu vaccine and a tetanus vaccine every 10 years. All adults age 61 years should receive a shingles vaccine once in their lifetime.   
 
-A bone mass density test is recommended when a woman turns 65 to screen for osteoporosis. This test is only recommended once as a screening. Some providers will use this same test as a disease monitoring tool if you already have osteoporosis. -All adults age 38-68 years who are overweight should have a diabetes screening test once every three years.  
 
-Other screening tests & preventive services for persons with diabetes include: an eye exam to screen for diabetic retinopathy, a kidney function test, a foot exam, and stricter control over your cholesterol.  
 
-Cardiovascular screening for adults with routine risk involves an electrocardiogram (ECG) at intervals determined by the provider.  
 
-Colorectal cancer screenings should be done for adults age 54-65 years with normal risk. There are a number of acceptable methods of screening for this type of cancer. Each test has its own benefits and drawbacks.  Discuss with your provider what is most appropriate for you during your annual wellness visit. The different tests include: colonoscopy (considered the best screening method), a fecal occult blood test, a fecal DNA test, and sigmoidoscopy. -Breast cancer screenings are recommended every other year for women of normal risk age 54-69 years.  
 
-Cervical cancer screenings for women over age 72 are only recommended with certain risk factors.  
 
-All adults born between [facility] should be screened once for Hepatitis C. Here is a list of your current Health Maintenance items (your personalized list of preventive services) with a due date: 
Health Maintenance Due Topic Date Due  
 Albumin Urine Test  02/24/2017  Breast Cancer Screening  03/16/2017  Bone Mineral Density   05/21/2017 [address] [place] [address] Annual Well Visit  03/14/2018  Flu Vaccine  08/01/2018 Introducing [facility] & HE[facility]! Dear Lisa Espana: Thank you for requesting a [employer] account. Our records indicate that you already have an active E[employer]LL[employer] account. You can access your account anytime at [email or web address] Did you know that you can access your hospital and ER discharge instructions at any time in [employer]? You can also review all of your test results from your hospital stay or ER visit. Additional Information If you have questions, please visit the Frequently Asked Questions section of the [employer] website at [email or web address] Remember, E[employer] is NOT to be used for urgent needs. For medical emergencies, dial 911. Now available from your iPhone and Android! Please provide this summary of care documentation to your next provider. Your primary care clinician is listed as Matt Caicedo. If you have any questions after today's visit, please call [phone].

## 2018-08-03 LAB
ALBUMIN SERPL-MCNC: 4.7 G/DL (ref 3.6–4.8)
ALBUMIN/CREAT UR: 5.4 MG/G CREAT (ref 0–30)
ALBUMIN/GLOB SERPL: 1.6 {RATIO} (ref 1.2–2.2)
ALP SERPL-CCNC: 70 IU/L (ref 39–117)
ALT SERPL-CCNC: 13 IU/L (ref 0–32)
APPEARANCE UR: CLEAR
AST SERPL-CCNC: 33 IU/L (ref 0–40)
BACTERIA #/AREA URNS HPF: ABNORMAL /[HPF]
BASOPHILS # BLD AUTO: 0 X10E3/UL (ref 0–0.2)
BASOPHILS NFR BLD AUTO: 0 %
BILIRUB SERPL-MCNC: 0.5 MG/DL (ref 0–1.2)
BILIRUB UR QL STRIP: NEGATIVE
BUN SERPL-MCNC: 13 MG/DL (ref 8–27)
BUN/CREAT SERPL: 16 (ref 12–28)
CALCIUM SERPL-MCNC: 10 MG/DL (ref 8.7–10.3)
CASTS URNS QL MICRO: ABNORMAL /LPF
CHLORIDE SERPL-SCNC: 102 MMOL/L (ref 96–106)
CHOLEST SERPL-MCNC: 199 MG/DL (ref 100–199)
CO2 SERPL-SCNC: 22 MMOL/L (ref 20–29)
COLOR UR: YELLOW
CREAT SERPL-MCNC: 0.83 MG/DL (ref 0.57–1)
CREAT UR-MCNC: 92.4 MG/DL
EOSINOPHIL # BLD AUTO: 0.1 X10E3/UL (ref 0–0.4)
EOSINOPHIL NFR BLD AUTO: 1 %
EPI CELLS #/AREA URNS HPF: ABNORMAL /HPF
ERYTHROCYTE [DISTWIDTH] IN BLOOD BY AUTOMATED COUNT: 13.7 % (ref 12.3–15.4)
EST. AVERAGE GLUCOSE BLD GHB EST-MCNC: 128 MG/DL
GLOBULIN SER CALC-MCNC: 2.9 G/DL (ref 1.5–4.5)
GLUCOSE SERPL-MCNC: 82 MG/DL (ref 65–99)
GLUCOSE UR QL: NEGATIVE
HBA1C MFR BLD: 6.1 % (ref 4.8–5.6)
HCT VFR BLD AUTO: 42.9 % (ref 34–46.6)
HDLC SERPL-MCNC: 64 MG/DL
HGB BLD-MCNC: 13.3 G/DL (ref 11.1–15.9)
HGB UR QL STRIP: NEGATIVE
IMM GRANULOCYTES # BLD: 0 X10E3/UL (ref 0–0.1)
IMM GRANULOCYTES NFR BLD: 0 %
KETONES UR QL STRIP: NEGATIVE
LDLC SERPL CALC-MCNC: 111 MG/DL (ref 0–99)
LEUKOCYTE ESTERASE UR QL STRIP: ABNORMAL
LYMPHOCYTES # BLD AUTO: 2.6 X10E3/UL (ref 0.7–3.1)
LYMPHOCYTES NFR BLD AUTO: 45 %
MCH RBC QN AUTO: 28.9 PG (ref 26.6–33)
MCHC RBC AUTO-ENTMCNC: 31 G/DL (ref 31.5–35.7)
MCV RBC AUTO: 93 FL (ref 79–97)
MICRO URNS: ABNORMAL
MICROALBUMIN UR-MCNC: 5 UG/ML
MONOCYTES # BLD AUTO: 0.2 X10E3/UL (ref 0.1–0.9)
MONOCYTES NFR BLD AUTO: 4 %
MUCOUS THREADS URNS QL MICRO: PRESENT
NEUTROPHILS # BLD AUTO: 2.9 X10E3/UL (ref 1.4–7)
NEUTROPHILS NFR BLD AUTO: 50 %
NITRITE UR QL STRIP: NEGATIVE
PH UR STRIP: 5.5 [PH] (ref 5–7.5)
PLATELET # BLD AUTO: 299 X10E3/UL (ref 150–379)
POTASSIUM SERPL-SCNC: 4 MMOL/L (ref 3.5–5.2)
PROT SERPL-MCNC: 7.6 G/DL (ref 6–8.5)
PROT UR QL STRIP: NEGATIVE
RBC # BLD AUTO: 4.61 X10E6/UL (ref 3.77–5.28)
RBC #/AREA URNS HPF: ABNORMAL /HPF
SODIUM SERPL-SCNC: 143 MMOL/L (ref 134–144)
SP GR UR: 1.02 (ref 1–1.03)
TRIGL SERPL-MCNC: 119 MG/DL (ref 0–149)
TSH SERPL DL<=0.005 MIU/L-ACNC: 1.62 UIU/ML (ref 0.45–4.5)
UROBILINOGEN UR STRIP-MCNC: 0.2 MG/DL (ref 0.2–1)
VLDLC SERPL CALC-MCNC: 24 MG/DL (ref 5–40)
WBC # BLD AUTO: 5.7 X10E3/UL (ref 3.4–10.8)
WBC #/AREA URNS HPF: ABNORMAL /HPF

## 2018-08-07 NOTE — PROGRESS NOTES
The patient returns today with a known history of anserine  bursitis, dyslipidemia, diabetes, fatty liver, primary  hypertension and is seen for evaluation. Her left knee continues  to bother her and she is using Voltaren Gel without any  improvement.

## 2018-08-07 NOTE — PROGRESS NOTES
very poor audio quality - low volume - static - please read  carefully for any potential errors and/or omissions    The patient has trace fluid and raises the question  of DJD of  the cause of discomfort in addition to the . She has a known  history of  fatty liver disease and is concerned about the NSAIDs  in the affect on her liver. She has normal liver function tests  and therefore it is safe for her to use it for a short period of  time. We suggest Naprosyn b.i.d. for the next 5 days with warm  compresses and Biofreeze. Thereafter, she states she will use it  sparingly as needed after meals. Appropriate laboratory studies will be requested. She will  return to see us in 4 weeks or as needed.

## 2019-01-31 ENCOUNTER — OFFICE VISIT (OUTPATIENT)
Dept: INTERNAL MEDICINE CLINIC | Age: 69
End: 2019-01-31

## 2019-01-31 VITALS
HEIGHT: 64 IN | DIASTOLIC BLOOD PRESSURE: 87 MMHG | RESPIRATION RATE: 18 BRPM | SYSTOLIC BLOOD PRESSURE: 152 MMHG | OXYGEN SATURATION: 98 % | TEMPERATURE: 97.9 F | BODY MASS INDEX: 25.61 KG/M2 | WEIGHT: 150 LBS | HEART RATE: 94 BPM

## 2019-01-31 DIAGNOSIS — Z12.31 ENCOUNTER FOR SCREENING MAMMOGRAM FOR MALIGNANT NEOPLASM OF BREAST: ICD-10-CM

## 2019-01-31 DIAGNOSIS — M70.52 PES ANSERINUS BURSITIS OF LEFT KNEE: ICD-10-CM

## 2019-01-31 DIAGNOSIS — E11.9 TYPE 2 DIABETES MELLITUS WITHOUT COMPLICATION, WITHOUT LONG-TERM CURRENT USE OF INSULIN (HCC): Primary | ICD-10-CM

## 2019-01-31 DIAGNOSIS — K76.0 FATTY LIVER: ICD-10-CM

## 2019-01-31 DIAGNOSIS — I10 ESSENTIAL HYPERTENSION: ICD-10-CM

## 2019-01-31 RX ORDER — METOPROLOL SUCCINATE 25 MG/1
25 TABLET, EXTENDED RELEASE ORAL DAILY
Qty: 30 TAB | Refills: 11 | Status: SHIPPED | OUTPATIENT
Start: 2019-01-31 | End: 2020-02-25 | Stop reason: SDUPTHER

## 2019-01-31 NOTE — PROGRESS NOTES
1. Have you been to the ER, urgent care clinic since your last visit? Hospitalized since your last visit? No 
 
2. Have you seen or consulted any other health care providers outside of the [address] [place] [address] since your last visit? Include any pap smears or colon screening. No  
 
Wants to discuss knee pain and handicap pass

## 2019-01-31 NOTE — PROGRESS NOTES
[facility] Griselda Neu, MD, [address] [address] Phone:  [phone]  Fax: [phone] Chief Complaint Patient presents with  Hypertension Mahendra Cochran SUBJECTIVE: 
  Holland Lin is a 79 y.o. female Returns today with known history of primary hypertension, diabetes mellitus type 2, steatosis of the liver, cholelithiasis, back pain, bursitis and is seen for evaluation. Patient returns today complaining of right knee pain, aggravated by prolonged standing or walking. It is aggravated by this cold weather. She needs some help with it. She takes Naprosyn sometimes, but it causes constipation. Patient is seen for evaluation. Current Outpatient Medications Medication Sig Dispense Refill  metoprolol succinate (TOPROL-XL) 25 mg XL tablet Take 1 Tab by mouth daily. 30 Tab 11  
 naproxen (NAPROSYN) 500 mg tablet TAKE 1 TABLET BY MOUTH TWICE DAILY WITH MEALS 180 Tab 3  
 metFORMIN ER (GLUCOPHAGE XR) 500 mg tablet TAKE 1 TABLET BY MOUTH EVERY EVENING WITH SUPPER 90 Tab 11  
 pravastatin (PRAVACHOL) 40 mg tablet TAKE 1 TABLET BY MOUTH EVERY EVENING 90 Tab 11  
 losartan (COZAAR) 100 mg tablet Take 1 Tab by mouth daily. 90 Tab 11  Lancets (MICROLET LANCET) misc Use to test blood sugar once daily. Dx.e11.9 100 Each 11  
 glucose blood VI test strips (CONTOUR NEXT STRIPS) strip Use to test blood sugar once daily. Dx.e11.9 50 Strip 11  
 clotrimazole-betamethasone (LOTRISONE) topical cream Apply outside vagina twice a day 15 g 0 Past Medical History:  
Diagnosis Date  Bereavement 2018  
 mother  copd,chf  
 Bursitis of knee  Chest pain  Diabetes ([address]  Fatty liver  Gall bladder stones  Hypertension  LBP (low back pain)  Pes anserinus bursitis of left knee  S/P colonoscopy 2014  Tenosynovitis of thumb 16 History reviewed. No pertinent surgical history. Allergies Allergen Reactions  Pcn [Penicillins] Unknown (comments) REVIEW OF SYSTEMS: 
General: negative for - chills or fever ENT: negative for - headaches, nasal congestion or tinnitus Respiratory: negative for - cough, hemoptysis, shortness of breath or wheezing Cardiovascular : negative for - chest pain, edema, palpitations or shortness of breath Gastrointestinal: negative for - abdominal pain, blood in stools, heartburn or nausea/vomiting Genito-Urinary: no dysuria, trouble voiding, or hematuria Musculoskeletal: negative for - gait disturbance, joint pain, joint stiffness or joint swelling Neurological: no TIA or stroke symptoms Hematologic: no bruises, no bleeding, no swollen glands Integument: no lumps, mole changes, nail changes or rash Endocrine: no malaise/lethargy or unexpected weight changes Social History Socioeconomic History  Marital status:  Spouse name: Not on file  Number of children: Not on file  Years of education: Not on file  Highest education level: Not on file Tobacco Use  Smoking status: Never Smoker  Smokeless tobacco: Never Used Substance and Sexual Activity  Alcohol use: No  
 Drug use: No  
 Sexual activity: Yes  
  Partners: Male Social History Narrative  
 m85  chf copd  fpased colon ca,prostate ca Social History: Alcohol Use Patient uses alcohol, Drinks per occasion: 2, Drinks per w [employer]: 0. Smoking Status Patient is a never smoker. Marital Status: , . Lives w ith: spouse. 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 Education/School: has highschool diploma, has college diploma. [employer]ism: [employer]. Family History Problem Relation Age of Onset  Cancer Father OBJECTIVE: 
 
Visit Vitals /87 Pulse 94 Temp 97.9 °F (36.6 °C) (Oral) Resp 18 Ht 5' 4\" (1.626 m) Wt 150 lb (68 kg) SpO2 98% BMI 25.75 kg/m² CONSTITUTIONAL: well , well nourished, appears age appropriate EYES: perrla, eom intact ENMT:moist mucous membranes, pharynx clear NECK: supple. Thyroid normal 
RESPIRATORY: Chest: clear bilaterally CARDIOVASCULAR: Heart: regular rate and rhythm GASTROINTESTINAL: Abdomen: soft, bowel sounds active HEMATOLOGIC: no pathological lymph nodes palpated MUSCULOSKELETAL: Extremities: no edema, pulse 1+ INTEGUMENT: No unusual rashes or suspicious skin lesions noted. Nails appear normal. 
NEUROLOGIC: non-focal exam  
MENTAL STATUS: alert and oriented, appropriate affect ASSESSMENT: 
1. Type 2 diabetes mellitus without complication, without long-term current use of insulin ([facility] [address] 2. Essential hypertension 3. Fatty liver 4. Pes anserinus bursitis of left knee 5. Encounter for screening mammogram for malignant neoplasm of breast    
 
I am not particularly happy with her blood pressure control. She states it is sometimes in the 130s-140s, but not consistently less than 130. Therefore we will put her on a small dose of a beta blocker at bedtime to see if we can drop her blood pressure to 120s. She will continue the Losartan in the morning. We also request a mammogram.  She prefers to have it someplace on the [address] [place] [address]. We refer her to [address] [place] [address]. She is agreeable. She will return to the office in 3-4 months, sooner if she needs to. I will check her LFTs in view of her history of hepatic steatosis. I have discussed the diagnosis with the patient and the intended plan as seen in the 
orders above. The patient understands and agees with the plan. The patient has  
received an after visit summary and questions were answered concerning 
future plans Patient labs and/or xrays were reviewed Past records were reviewed. PLAN: 
. Orders Placed This Encounter  CHARI MAMMO BI SCREENING INCL CAD  HEMOGLOBIN A1C WITH EAG  
 METABOLIC PANEL, COMPREHENSIVE  
  metoprolol succinate (TOPROL-XL) 25 mg XL tablet Follow-up Disposition: 
Return in about 4 months (around 5/31/2019). ATTENTION:  
This medical record was transcribed using an electronic medical records system. Although proofread, it may and can contain electronic and spelling errors. Other human spelling and other errors may be present. Corrections may be executed at a later time. Please feel free to contact us for any clarifications as needed.

## 2019-02-01 LAB
ALBUMIN SERPL-MCNC: 4.4 G/DL (ref 3.6–4.8)
ALBUMIN/GLOB SERPL: 1.5 {RATIO} (ref 1.2–2.2)
ALP SERPL-CCNC: 62 IU/L (ref 39–117)
ALT SERPL-CCNC: 16 IU/L (ref 0–32)
AST SERPL-CCNC: 34 IU/L (ref 0–40)
BILIRUB SERPL-MCNC: 0.8 MG/DL (ref 0–1.2)
BUN SERPL-MCNC: 14 MG/DL (ref 8–27)
BUN/CREAT SERPL: 18 (ref 12–28)
CALCIUM SERPL-MCNC: 9.8 MG/DL (ref 8.7–10.3)
CHLORIDE SERPL-SCNC: 102 MMOL/L (ref 96–106)
CO2 SERPL-SCNC: 25 MMOL/L (ref 20–29)
CREAT SERPL-MCNC: 0.76 MG/DL (ref 0.57–1)
EST. AVERAGE GLUCOSE BLD GHB EST-MCNC: 148 MG/DL
GLOBULIN SER CALC-MCNC: 2.9 G/DL (ref 1.5–4.5)
GLUCOSE SERPL-MCNC: 154 MG/DL (ref 65–99)
HBA1C MFR BLD: 6.8 % (ref 4.8–5.6)
POTASSIUM SERPL-SCNC: 4.1 MMOL/L (ref 3.5–5.2)
PROT SERPL-MCNC: 7.3 G/DL (ref 6–8.5)
SODIUM SERPL-SCNC: 141 MMOL/L (ref 134–144)

## 2019-05-30 ENCOUNTER — OFFICE VISIT (OUTPATIENT)
Dept: INTERNAL MEDICINE CLINIC | Age: 69
End: 2019-05-30

## 2019-05-30 VITALS
RESPIRATION RATE: 15 BRPM | BODY MASS INDEX: 25.74 KG/M2 | DIASTOLIC BLOOD PRESSURE: 82 MMHG | TEMPERATURE: 97.9 F | HEART RATE: 90 BPM | SYSTOLIC BLOOD PRESSURE: 136 MMHG | OXYGEN SATURATION: 97 % | WEIGHT: 150.8 LBS | HEIGHT: 64 IN

## 2019-05-30 DIAGNOSIS — K76.0 FATTY LIVER: ICD-10-CM

## 2019-05-30 DIAGNOSIS — E11.9 TYPE 2 DIABETES MELLITUS WITHOUT COMPLICATION, WITHOUT LONG-TERM CURRENT USE OF INSULIN (HCC): Primary | ICD-10-CM

## 2019-05-30 DIAGNOSIS — I10 ESSENTIAL HYPERTENSION: ICD-10-CM

## 2019-05-30 DIAGNOSIS — Z12.31 ENCOUNTER FOR SCREENING MAMMOGRAM FOR MALIGNANT NEOPLASM OF BREAST: ICD-10-CM

## 2019-05-30 DIAGNOSIS — K80.20 GALL BLADDER STONES: ICD-10-CM

## 2019-05-30 RX ORDER — FLUCONAZOLE 150 MG/1
150 TABLET ORAL DAILY
Qty: 2 TAB | Refills: 1 | Status: SHIPPED | OUTPATIENT
Start: 2019-05-30 | End: 2019-05-31

## 2019-05-30 NOTE — PROGRESS NOTES
Chief Complaint   Patient presents with    Hypertension     follow up      1. Have you been to the ER, urgent care clinic since your last visit? Hospitalized since your last visit? No    2. Have you seen or consulted any other health care providers outside of the [address] [place] [address] since your last visit? Include any pap smears or colon screening.  No

## 2019-05-30 NOTE — PROGRESS NOTES
[facility]  Sangeetha Yeung MD, [address] [address]  Phone:  [phone]  Fax: [phone]       Chief Complaint   Patient presents with    Hypertension     follow up    . SUBJECTIVE:    Skye Moncada is a 81 y.o. female Patient returns today with known history of diabetes, fatty liver, gallstones, primary hypertension, and is seen for evaluation. She is using Metformin daily for her diabetes and we note that her hemoglobin A1c performed in January was 6.8, indicating excellent control. Patient is seen for evaluation. Patient returns today complaining of vaginal yeast infection. She claims that her blood sugars are not well controlled. She is celebrating her one year anniversary this summer. Patient is seen for evaluation. Current Outpatient Medications   Medication Sig Dispense Refill    fluconazole (DIFLUCAN) 150 mg tablet Take 1 Tab by mouth daily for 1 day. 2 Tab 1    losartan (COZAAR) 100 mg tablet TAKE 1 TABLET BY MOUTH DAILY 90 Tab 3    glucose blood VI test strips (CONTOUR NEXT TEST STRIPS) strip USE TO TEST BLOOD SUGAR DAILY 150 Strip 11    metoprolol succinate (TOPROL-XL) 25 mg XL tablet Take 1 Tab by mouth daily. 30 Tab 11    naproxen (NAPROSYN) 500 mg tablet TAKE 1 TABLET BY MOUTH TWICE DAILY WITH MEALS 180 Tab 3    metFORMIN ER (GLUCOPHAGE XR) 500 mg tablet TAKE 1 TABLET BY MOUTH EVERY EVENING WITH SUPPER 90 Tab 11    pravastatin (PRAVACHOL) 40 mg tablet TAKE 1 TABLET BY MOUTH EVERY EVENING 90 Tab 11    Lancets (MICROLET LANCET) misc Use to test blood sugar once daily.  Dx.e11.9 100 Each 11    clotrimazole-betamethasone (LOTRISONE) topical cream Apply outside vagina twice a day 15 g 0     Past Medical History:   Diagnosis Date    Bereavement 2018    mother  copd,chf    Bursitis of knee     Chest pain     Diabetes ([address]     Fatty liver     Gall bladder stones     Hypertension     LBP (low back pain)  Pes anserinus bursitis of left knee     S/P colonoscopy 2014    Tenosynovitis of thumb 16     History reviewed. No pertinent surgical history. Allergies   Allergen Reactions    Pcn [Penicillins] Unknown (comments)         REVIEW OF SYSTEMS:  General: negative for - chills or fever  ENT: negative for - headaches, nasal congestion or tinnitus  Respiratory: negative for - cough, hemoptysis, shortness of breath or wheezing  Cardiovascular : negative for - chest pain, edema, palpitations or shortness of breath  Gastrointestinal: negative for - abdominal pain, blood in stools, heartburn or nausea/vomiting  Genito-Urinary: no dysuria, trouble voiding, or hematuria  Musculoskeletal: negative for - gait disturbance, joint pain, joint stiffness or joint swelling  Neurological: no TIA or stroke symptoms  Hematologic: no bruises, no bleeding, no swollen glands  Integument: no lumps, mole changes, nail changes or rash  Endocrine: no malaise/lethargy or unexpected weight changes      Social History     Socioeconomic History    Marital status:      Spouse name: Not on file    Number of children: Not on file    Years of education: Not on file    Highest education level: Not on file   Tobacco Use    Smoking status: Never Smoker    Smokeless tobacco: Never Used   Substance and Sexual Activity    Alcohol use: No    Drug use: No    Sexual activity: Yes     Partners: Male   Social History Narrative    m85  chf copd  fpased colon ca,prostate ca        Social History: Alcohol Use Patient uses alcohol, Drinks per occasion: 2, Drinks per w [employer]: 0. Smoking Status Patient is a never smoker. Marital Status: , . Lives w ith: spouse. 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 Education/School: has highschool diploma, has college diploma. Faith: [employer].      Family History   Problem Relation Age of Onset    Cancer Father        OBJECTIVE:    Visit Vitals  /82   Pulse 90   Temp 97.9 °F (36.6 °C) (Oral)   Resp 15   Ht 5' 4\" (1.626 m)   Wt 150 lb 12.8 oz (68.4 kg)   SpO2 97%   BMI 25.88 kg/m²     CONSTITUTIONAL: well , well nourished, appears age appropriate  EYES: perrla, eom intact  ENMT:moist mucous membranes, pharynx clear  NECK: supple. Thyroid normal  RESPIRATORY: Chest: clear bilaterally   CARDIOVASCULAR: Heart: regular rate and rhythm  GASTROINTESTINAL: Abdomen: soft, bowel sounds active  HEMATOLOGIC: no pathological lymph nodes palpated  MUSCULOSKELETAL: Extremities: no edema, pulse 1+   INTEGUMENT: No unusual rashes or suspicious skin lesions noted. Nails appear normal.  NEUROLOGIC: non-focal exam   MENTAL STATUS: alert and oriented, appropriate affect           ASSESSMENT:  1. Type 2 diabetes mellitus without complication, without long-term current use of insulin ([address]    2. Fatty liver    3. Gall bladder stones    4. Essential hypertension    5. Encounter for screening mammogram for malignant neoplasm of breast       Patient will be treated for a yeast infection with Diflucan. She is curious about her blood type. We advised her she would ________________ unit of blood before that could be accomplished. BP control is adequate. Her BMI reflects ideal body weight. Will check hemoglobin A1c today for blood sugar control. She is not up to date with mammograms and we suggest she complete the mammograms. She will be back to see us in four to six months, sooner if she needs to. I have discussed the diagnosis with the patient and the intended plan as seen in the  orders above. The patient understands and agees with the plan. The patient has   received an after visit summary and questions were answered concerning  future plans  Patient labs and/or xrays were reviewed  Past records were reviewed.     PLAN:  .  Orders Placed This Encounter    CHARI MAMMO BI SCREENING INCL CAD    HEMOGLOBIN A1C WITH EAG    fluconazole (DIFLUCAN) 150 mg tablet       Follow-up and Dispositions    · Return in about 4 months (around 9/30/2019). ATTENTION:   This medical record was transcribed using an electronic medical records system. Although proofread, it may and can contain electronic and spelling errors. Other human spelling and other errors may be present. Corrections may be executed at a later time. Please feel free to contact us for any clarifications as needed.

## 2019-05-31 LAB
EST. AVERAGE GLUCOSE BLD GHB EST-MCNC: 169 MG/DL
HBA1C MFR BLD: 7.5 % (ref 4.8–5.6)

## 2019-06-01 RX ORDER — METFORMIN HYDROCHLORIDE 500 MG/1
1000 TABLET, EXTENDED RELEASE ORAL
Qty: 180 TAB | Refills: 11 | Status: SHIPPED | OUTPATIENT
Start: 2019-06-01 | End: 2020-08-10 | Stop reason: SDUPTHER

## 2019-07-11 RX ORDER — PRAVASTATIN SODIUM 40 MG/1
TABLET ORAL
Qty: 90 TAB | Refills: 0 | Status: SHIPPED | OUTPATIENT
Start: 2019-07-11 | End: 2019-10-04 | Stop reason: SDUPTHER

## 2019-10-03 ENCOUNTER — OFFICE VISIT (OUTPATIENT)
Dept: INTERNAL MEDICINE CLINIC | Age: 69
End: 2019-10-03

## 2019-10-03 VITALS
BODY MASS INDEX: 24.92 KG/M2 | OXYGEN SATURATION: 95 % | DIASTOLIC BLOOD PRESSURE: 102 MMHG | TEMPERATURE: 97.5 F | WEIGHT: 146 LBS | SYSTOLIC BLOOD PRESSURE: 175 MMHG | HEIGHT: 64 IN | RESPIRATION RATE: 16 BRPM | HEART RATE: 85 BPM

## 2019-10-03 DIAGNOSIS — Z13.39 SCREENING FOR ALCOHOLISM: ICD-10-CM

## 2019-10-03 DIAGNOSIS — Z12.11 SCREEN FOR COLON CANCER: ICD-10-CM

## 2019-10-03 DIAGNOSIS — K76.0 FATTY LIVER: ICD-10-CM

## 2019-10-03 DIAGNOSIS — K80.20 GALL BLADDER STONES: ICD-10-CM

## 2019-10-03 DIAGNOSIS — Z13.31 SCREENING FOR DEPRESSION: ICD-10-CM

## 2019-10-03 DIAGNOSIS — E11.9 TYPE 2 DIABETES MELLITUS WITHOUT COMPLICATION, WITHOUT LONG-TERM CURRENT USE OF INSULIN (HCC): ICD-10-CM

## 2019-10-03 DIAGNOSIS — Z00.00 MEDICARE ANNUAL WELLNESS VISIT, SUBSEQUENT: Primary | ICD-10-CM

## 2019-10-03 DIAGNOSIS — Z13.1 SCREENING FOR DIABETES MELLITUS: ICD-10-CM

## 2019-10-03 DIAGNOSIS — I10 ESSENTIAL HYPERTENSION: ICD-10-CM

## 2019-10-03 RX ORDER — TELMISARTAN 80 MG/1
80 TABLET ORAL DAILY
Qty: 30 TAB | Refills: 11 | Status: SHIPPED | OUTPATIENT
Start: 2019-10-03 | End: 2020-01-02 | Stop reason: SDUPTHER

## 2019-10-03 NOTE — PATIENT INSTRUCTIONS
Medicare Wellness Visit, Female The best way to live healthy is to have a lifestyle where you eat a well-balanced diet, exercise regularly, limit alcohol use, and quit all forms of tobacco/nicotine, if applicable. Regular preventive services are another way to keep healthy. Preventive services (vaccines, screening tests, monitoring & exams) can help personalize your care plan, which helps you manage your own care. Screening tests can find health problems at the earliest stages, when they are easiest to treat. [facility] follows the current, evidence-based guidelines published by the [facility] Sanjay Sindhu ([facility]) when recommending preventive services for our patients. Because we follow these guidelines, sometimes recommendations change over time as research supports it. (For example, mammograms used to be recommended annually. Even though Medicare will still pay for an annual mammogram, the newer guidelines recommend a mammogram every two years for women of average risk.) Of course, you and your doctor may decide to screen more often for some diseases, based on your risk and your health status. Preventive services for you include: - Medicare offers their members a free annual wellness visit, which is time for you and your primary care provider to discuss and plan for your preventive service needs. Take advantage of this benefit every year! 
-All adults over the age of 72 should receive the recommended pneumonia vaccines. Current USPSTF guidelines recommend a series of two vaccines for the best pneumonia protection.  
-All adults should have a flu vaccine yearly and a tetanus vaccine every 10 years. All adults age 61 and older should receive a shingles vaccine once in their lifetime.   
-A bone mass density test is recommended when a woman turns 65 to screen for osteoporosis. This test is only recommended one time, as a screening. Some providers will use this same test as a disease monitoring tool if you already have osteoporosis. -All adults age 38-68 who are overweight should have a diabetes screening test once every three years.  
-Other screening tests and preventive services for persons with diabetes include: an eye exam to screen for diabetic retinopathy, a kidney function test, a foot exam, and stricter control over your cholesterol.  
-Cardiovascular screening for adults with routine risk involves an electrocardiogram (ECG) at intervals determined by your doctor.  
-Colorectal cancer screenings should be done for adults age 54-65 with no increased risk factors for colorectal cancer. There are a number of acceptable methods of screening for this type of cancer. Each test has its own benefits and drawbacks. Discuss with your doctor what is most appropriate for you during your annual wellness visit. The different tests include: colonoscopy (considered the best screening method), a fecal occult blood test, a fecal DNA test, and sigmoidoscopy. -Breast cancer screenings are recommended every other year for women of normal risk, age 54-69. 
-Cervical cancer screenings for women over age 72 are only recommended with certain risk factors.  
-All adults born between [facility] should be screened once for Hepatitis C. Here is a list of your current Health Maintenance items (your personalized list of preventive services) with a due date: 
Health Maintenance Due Topic Date Due  Shingles Vaccine (1 of 2) 05/21/2002  Mammogram  03/16/2017 [address] [place] [address] Eye Exam  03/16/2017  Bone Mineral Density   05/21/2017  Pneumococcal Vaccine (1 of 2 - PCV13) 05/21/2017 [address] [place] [address] Diabetic Foot Care  03/07/2019  Albumin Urine Test  08/01/2019  Cholesterol Test   08/01/2019  Flu Vaccine  08/01/2019 [address] [place] [address] Annual Well Visit  08/02/2019  Glaucoma Screening   09/29/2019 Body Mass Index: Care Instructions Your Care Instructions Body mass index (BMI) can help you see if your weight is raising your risk for health problems. It uses a formula to compare how much you weigh with how tall you are. · A BMI lower than 18.5 is considered underweight. · A BMI between 18.5 and 24.9 is considered healthy. · A BMI between 25 and 29.9 is considered overweight. A BMI of 30 or higher is considered obese. If your BMI is in the normal range, it means that you have a lower risk for weight-related health problems. If your BMI is in the overweight or obese range, you may be at increased risk for weight-related health problems, such as high blood pressure, heart disease, stroke, arthritis or joint pain, and diabetes. If your BMI is in the underweight range, you may be at increased risk for health problems such as fatigue, lower protection (immunity) against illness, muscle loss, bone loss, hair loss, and hormone problems. BMI is just one measure of your risk for weight-related health problems. You may be at higher risk for health problems if you are not active, you eat an unhealthy diet, or you drink too much alcohol or use tobacco products. Follow-up care is a key part of your treatment and safety. Be sure to make and go to all appointments, and call your doctor if you are having problems. It's also a good idea to know your test results and keep a list of the medicines you take. How can you care for yourself at home? · Practice healthy eating habits. This includes eating plenty of fruits, vegetables, whole grains, lean protein, and low-fat dairy. · If your doctor recommends it, get more exercise. Walking is a good choice. Bit by bit, increase the amount you walk every day. Try for at least 30 minutes on most days of the week. · Do not smoke. Smoking can increase your risk for health problems. If you need help quitting, talk to your doctor about stop-smoking programs and medicines. These can increase your chances of quitting for good. · Limit alcohol to 2 drinks a day for men and 1 drink a day for women. Too much alcohol can cause health problems. If you have a BMI higher than 25 · Your doctor may do other tests to check your risk for weight-related health problems. This may include measuring the distance around your waist. A waist measurement of more than 40 inches in men or 35 inches in women can increase the risk of weight-related health problems. · Talk with your doctor about steps you can take to stay healthy or improve your health. You may need to make lifestyle changes to lose weight and stay healthy, such as changing your diet and getting regular exercise. If you have a BMI lower than 18.5 · Your doctor may do other tests to check your risk for health problems. · Talk with your doctor about steps you can take to stay healthy or improve your health. You may need to make lifestyle changes to gain or maintain weight and stay healthy, such as getting more healthy foods in your diet and doing exercises to build muscle. Where can you learn more? Go to http://cuco-thanh.info/. Enter S176 in the search box to learn more about \"Body Mass Index: Care Instructions. \" Current as of: October 13, 2016 Content Version: 11.4 © 2899-4018 [employer]. Care instructions adapted under license by [employer] (which disclaims liability or warranty for this information). If you have questions about a medical condition or this instruction, always ask your healthcare professional. [place] [address] any warranty or liability for your use of this information. Medicare Wellness Visit, Female The best way to live healthy is to have a lifestyle where you eat a well-balanced diet, exercise regularly, limit alcohol use, and quit all forms of tobacco/nicotine, if applicable. Regular preventive services are another way to keep healthy.  Preventive services (vaccines, screening tests, monitoring & exams) can help personalize your care plan, which helps you manage your own care. Screening tests can find health problems at the earliest stages, when they are easiest to treat. [facility] follows the current, evidence-based guidelines published by the [facility] Manley ([facility]) when recommending preventive services for our patients. Because we follow these guidelines, sometimes recommendations change over time as research supports it. (For example, mammograms used to be recommended annually. Even though Medicare will still pay for an annual mammogram, the newer guidelines recommend a mammogram every two years for women of average risk.) Of course, you and your doctor may decide to screen more often for some diseases, based on your risk and your health status. Preventive services for you include: - Medicare offers their members a free annual wellness visit, which is time for you and your primary care provider to discuss and plan for your preventive service needs. Take advantage of this benefit every year! 
-All adults over the age of 72 should receive the recommended pneumonia vaccines. Current USPSTF guidelines recommend a series of two vaccines for the best pneumonia protection.  
-All adults should have a flu vaccine yearly and a tetanus vaccine every 10 years. All adults age 61 and older should receive a shingles vaccine once in their lifetime.   
-A bone mass density test is recommended when a woman turns 65 to screen for osteoporosis. This test is only recommended one time, as a screening. Some providers will use this same test as a disease monitoring tool if you already have osteoporosis.  
-All adults age 38-68 who are overweight should have a diabetes screening test once every three years.  
-Other screening tests and preventive services for persons with diabetes include: an eye exam to screen for diabetic retinopathy, a kidney function test, a foot exam, and stricter control over your cholesterol.  
-Cardiovascular screening for adults with routine risk involves an electrocardiogram (ECG) at intervals determined by your doctor.  
-Colorectal cancer screenings should be done for adults age 54-65 with no increased risk factors for colorectal cancer. There are a number of acceptable methods of screening for this type of cancer. Each test has its own benefits and drawbacks. Discuss with your doctor what is most appropriate for you during your annual wellness visit. The different tests include: colonoscopy (considered the best screening method), a fecal occult blood test, a fecal DNA test, and sigmoidoscopy. -Breast cancer screenings are recommended every other year for women of normal risk, age 54-69. 
-Cervical cancer screenings for women over age 72 are only recommended with certain risk factors.  
-All adults born between [facility] should be screened once for Hepatitis C. Here is a list of your current Health Maintenance items (your personalized list of preventive services) with a due date: 
Health Maintenance Due Topic Date Due  Shingles Vaccine (1 of 2) 05/21/2002  Mammogram  03/16/2017 [address] [place] [address] Eye Exam  03/16/2017  Bone Mineral Density   05/21/2017  Pneumococcal Vaccine (1 of 2 - PCV13) 05/21/2017 [address] [place] [address] Diabetic Foot Care  03/07/2019  Albumin Urine Test  08/01/2019  Cholesterol Test   08/01/2019  Flu Vaccine  08/01/2019 [address] [place] [address] Annual Well Visit  08/02/2019  Glaucoma Screening   09/29/2019

## 2019-10-03 NOTE — PROGRESS NOTES
SPORTS MEDICINE AND PRIMARY CARE  Figueroa Palafox MD, [address] [address]  Phone:  [phone]  Fax: [phone]       Chief Complaint   Patient presents with   [facility] Wellness Visit     Patient is here for a medicare wellness. .      SUBJECTIVE:    Thomas Somers is a 81 y.o. female Patient returns today with known history of DM type 2, fatty liver, gallstones, primary hypertension, and is seen for evaluation. Patient has a history of RA and complains of left arm pain and is seen for evaluation. Current Outpatient Medications   Medication Sig Dispense Refill    telmisartan (MICARDIS) 80 mg tablet Take 1 Tab by mouth daily. 30 Tab 11    pravastatin (PRAVACHOL) 40 mg tablet TAKE 1 TABLET BY MOUTH EVERY EVENING 90 Tab 0    metFORMIN ER (GLUCOPHAGE XR) 500 mg tablet Take 2 Tabs by mouth daily (with dinner). 180 Tab 11    glucose blood VI test strips (CONTOUR NEXT TEST STRIPS) strip USE TO TEST BLOOD SUGAR DAILY 150 Strip 11    metoprolol succinate (TOPROL-XL) 25 mg XL tablet Take 1 Tab by mouth daily. 30 Tab 11    Lancets (MICROLET LANCET) misc Use to test blood sugar once daily. Dx.e11.9 100 Each 11     Past Medical History:   Diagnosis Date    Bereavement 2018    mother  copd,chf    Bursitis of knee     Chest pain     Diabetes ([address]     Fatty liver     Gall bladder stones     Hypertension     LBP (low back pain)     Pes anserinus bursitis of left knee     S/P colonoscopy 2014    Tenosynovitis of thumb 16     History reviewed. No pertinent surgical history.   Allergies   Allergen Reactions    Pcn [Penicillins] Unknown (comments)         REVIEW OF SYSTEMS:  General: negative for - chills or fever  ENT: negative for - headaches, nasal congestion or tinnitus  Respiratory: negative for - cough, hemoptysis, shortness of breath or wheezing  Cardiovascular : negative for - chest pain, edema, palpitations or shortness of breath  Gastrointestinal: negative for - abdominal pain, blood in stools, heartburn or nausea/vomiting  Genito-Urinary: no dysuria, trouble voiding, or hematuria  Musculoskeletal: negative for - gait disturbance, joint pain, joint stiffness or joint swelling  Neurological: no TIA or stroke symptoms  Hematologic: no bruises, no bleeding, no swollen glands  Integument: no lumps, mole changes, nail changes or rash  Endocrine: no malaise/lethargy or unexpected weight changes      Social History     Socioeconomic History    Marital status:      Spouse name: Not on file    Number of children: Not on file    Years of education: Not on file    Highest education level: Not on file   Tobacco Use    Smoking status: Never Smoker    Smokeless tobacco: Never Used   Substance and Sexual Activity    Alcohol use: No    Drug use: No    Sexual activity: Yes     Partners: Male   Social History Narrative    m85  chf copd  fpased colon ca,prostate ca        Social History: Alcohol Use Patient uses alcohol, Drinks per occasion: 2, Drinks per w [place]: 0. Smoking Status Patient is a never smoker. Marital Status: , . Lives w ith: spouse. 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 Education/School: has highschool diploma, has college diploma. [employer]: [employer]. Family History   Problem Relation Age of Onset    Cancer Father        OBJECTIVE:    Visit Vitals  BP (!) 175/102   Pulse 85   Temp 97.5 °F (36.4 °C)   Resp 16   Ht 5' 4\" (1.626 m)   Wt 146 lb (66.2 kg)   SpO2 95%   BMI 25.06 kg/m²     CONSTITUTIONAL: well , well nourished, appears age appropriate  EYES: perrla, eom intact  ENMT:moist mucous membranes, pharynx clear  NECK: supple.  Thyroid normal  RESPIRATORY: Chest: clear bilaterally   CARDIOVASCULAR: Heart: regular rate and rhythm  GASTROINTESTINAL: Abdomen: soft, bowel sounds active  HEMATOLOGIC: no pathological lymph nodes palpated  MUSCULOSKELETAL: Extremities: no edema, pulse 1+   INTEGUMENT: No unusual rashes or suspicious skin lesions noted. Nails appear normal.  NEUROLOGIC: non-focal exam   MENTAL STATUS: alert and oriented, appropriate affect           ASSESSMENT:  1. Medicare annual wellness visit, subsequent    2. Type 2 diabetes mellitus without complication, without long-term current use of insulin ([facility]    3. Fatty liver    4. Gall bladder stones    5. Essential hypertension    6. Screening for alcoholism    7. Screening for depression    8. Screen for colon cancer    9. Screening for diabetes mellitus      Blood sugar control will be assessed with hemoglobin A1c and we will send the results in the mail. Fatty liver is unchanged. Will continue to follow LFTs and refer her only to a hepatologist if they are abnormal.  Gallbladder stones are stable. BP is elevated. She does not usually have high BP. She is taking her medications regularly. It is a little bothersome to her. She is also concerned about the recall for the [facility]) in Losartan. Will stop Losartan and give her Micardis. Before we make another major change in her BP medication we will have her come back in a week for BP check. If her BP remains elevated at that time, will add Amlodipine to her current regimen. I have discussed the diagnosis with the patient and the intended plan as seen in the  orders above. The patient understands and agees with the plan. The patient has   received an after visit summary and questions were answered concerning  future plans  Patient labs and/or xrays were reviewed  Past records were reviewed.     PLAN:  .  Orders Placed This Encounter    Depression Screen Annual    URINALYSIS W/ RFLX MICROSCOPIC    CBC WITH AUTOMATED DIFF    METABOLIC PANEL, COMPREHENSIVE    LIPID PANEL    TSH 3RD GENERATION    HEMOGLOBIN A1C WITH EAG    FREDERICK W/REFLEX    RA + CCP ABS    telmisartan (MICARDIS) 80 mg tablet       Follow-up and Dispositions    · Return in about 1 week (around 10/10/2019) for bp check. ATTENTION:   This medical record was transcribed using an electronic medical records system. Although proofread, it may and can contain electronic and spelling errors. Other human spelling and other errors may be present. Corrections may be executed at a later time. Please feel free to contact us for any clarifications as needed. Discussed the patient's BMI with her. The BMI follow up plan is as follows:     dietary management education, guidance, and counseling  encourage exercise  monitor weight  prescribed dietary intake    An After Visit Summary was printed and given to the patient.

## 2019-10-03 NOTE — PROGRESS NOTES
Chief Complaint   Patient presents with   [facility] Annual Wellness Visit     Patient is here for a medicare wellness. 1. Have you been to the ER, urgent care clinic since your last visit? Hospitalized since your last visit? No    2. Have you seen or consulted any other health care providers outside of the [address] [place] [address] since your last visit? Include any pap smears or colon screening. No     Patient stated that she went to the ER for a cut and received a TDap. This is the Subsequent Medicare Annual Wellness Exam, performed 12 months or more after the Initial AWV or the last Subsequent AWV    I have reviewed the patient's medical history in detail and updated the computerized patient record. History     Past Medical History:   Diagnosis Date    Bereavement 2018    mother  copd,chf    Bursitis of knee     Chest pain     Diabetes ([address]     Fatty liver     Gall bladder stones     Hypertension     LBP (low back pain)     Pes anserinus bursitis of left knee     S/P colonoscopy     Tenosynovitis of thumb 16      History reviewed. No pertinent surgical history. Current Outpatient Medications   Medication Sig Dispense Refill    telmisartan (MICARDIS) 80 mg tablet Take 1 Tab by mouth daily. 30 Tab 11    pravastatin (PRAVACHOL) 40 mg tablet TAKE 1 TABLET BY MOUTH EVERY EVENING 90 Tab 0    metFORMIN ER (GLUCOPHAGE XR) 500 mg tablet Take 2 Tabs by mouth daily (with dinner). 180 Tab 11    glucose blood VI test strips (CONTOUR NEXT TEST STRIPS) strip USE TO TEST BLOOD SUGAR DAILY 150 Strip 11    metoprolol succinate (TOPROL-XL) 25 mg XL tablet Take 1 Tab by mouth daily. 30 Tab 11    Lancets (MICROLET LANCET) misc Use to test blood sugar once daily.  Dx.e11.9 100 Each 11     Allergies   Allergen Reactions    Pcn [Penicillins] Unknown (comments)     Family History   Problem Relation Age of Onset    Cancer Father      Social History     Tobacco Use    Smoking status: Never Smoker    Smokeless tobacco: Never Used   Substance Use Topics    Alcohol use: No     Patient Active Problem List   Diagnosis Code    Hypertension I10    Diabetes ([facility] E11.9    Fatty liver K76.0    LBP (low back pain) M54.5    Gall bladder stones K80.20    Tenosynovitis of thumb M65.9    Chest pain R07.9    Bursitis of knee M70.50    Pes anserinus bursitis of left knee M70.52    Bereavement Z63.4       Depression Risk Factor Screening:     3 most recent PHQ Screens 10/3/2019   PHQ Not Done -   Little interest or pleasure in doing things Not at all   Feeling down, depressed, irritable, or hopeless Not at all   Total Score PHQ 2 0     Alcohol Risk Factor Screening: You do not drink alcohol or very rarely. Functional Ability and Level of Safety:   Hearing Loss  Hearing is good. Activities of Daily Living  The home contains: no safety equipment. Patient does total self care    Fall Risk  Fall Risk Assessment, last 12 mths 10/3/2019   Able to walk? Yes   Fall in past 12 months? No       Abuse Screen  Patient is not abused    Cognitive Screening   Evaluation of Cognitive Function:  Has your family/caregiver stated any concerns about your memory: no  Normal    Patient Care Team   Patient Care Team:  Merlyn Noguera MD as PCP - General (Internal Medicine)    Assessment/Plan   Education and counseling provided:  Are appropriate based on today's review and evaluation    Diagnoses and all orders for this visit:    1. Type 2 diabetes mellitus without complication, without long-term current use of insulin (HCC)  -     URINALYSIS W/ RFLX MICROSCOPIC  -     CBC WITH AUTOMATED DIFF  -     METABOLIC PANEL, COMPREHENSIVE  -     LIPID PANEL  -     TSH 3RD GENERATION  -     COLLECTION VENOUS BLOOD,VENIPUNCTURE  -     HEMOGLOBIN A1C WITH EAG  -     FREDERICK W/REFLEX  -     RA + CCP ABS    2. Fatty liver    3. Gall bladder stones    4.  Essential hypertension    Other orders  -     telmisartan (MICARDIS) 80 mg tablet; Take 1 Tab by mouth daily.         Health Maintenance Due   Topic Date Due    Shingrix Vaccine Age 49> (1 of 2) 05/21/2002    BREAST CANCER SCRN MAMMOGRAM  03/16/2017    EYE EXAM RETINAL OR DILATED  03/16/2017    Bone Densitometry (Dexa) Screening  05/21/2017    Pneumococcal 65+ years (1 of 2 - PCV13) 05/21/2017    FOOT EXAM Q1  03/07/2019    MICROALBUMIN Q1  08/01/2019    LIPID PANEL Q1  08/01/2019    Influenza Age 9 to Adult  08/01/2019    MEDICARE YEARLY EXAM  08/02/2019    GLAUCOMA SCREENING Q2Y  09/29/2019

## 2019-10-04 LAB
APPEARANCE UR: CLEAR
BACTERIA #/AREA URNS HPF: ABNORMAL /[HPF]
BILIRUB UR QL STRIP: NEGATIVE
CASTS URNS QL MICRO: ABNORMAL /LPF
COLOR UR: YELLOW
CRYSTALS URNS MICRO: ABNORMAL
EPI CELLS #/AREA URNS HPF: >10 /HPF (ref 0–10)
GLUCOSE UR QL: ABNORMAL
HGB UR QL STRIP: NEGATIVE
KETONES UR QL STRIP: NEGATIVE
LEUKOCYTE ESTERASE UR QL STRIP: ABNORMAL
MICRO URNS: ABNORMAL
MUCOUS THREADS URNS QL MICRO: PRESENT
NITRITE UR QL STRIP: NEGATIVE
PH UR STRIP: 5 [PH] (ref 5–7.5)
PROT UR QL STRIP: NEGATIVE
RBC #/AREA URNS HPF: ABNORMAL /HPF (ref 0–2)
SP GR UR: 1.02 (ref 1–1.03)
UNIDENT CRYS URNS QL MICRO: PRESENT
UROBILINOGEN UR STRIP-MCNC: 0.2 MG/DL (ref 0.2–1)
WBC #/AREA URNS HPF: ABNORMAL /HPF (ref 0–5)

## 2019-10-11 ENCOUNTER — CLINICAL SUPPORT (OUTPATIENT)
Dept: INTERNAL MEDICINE CLINIC | Age: 69
End: 2019-10-11

## 2019-10-11 VITALS
HEIGHT: 64 IN | DIASTOLIC BLOOD PRESSURE: 84 MMHG | SYSTOLIC BLOOD PRESSURE: 137 MMHG | BODY MASS INDEX: 25 KG/M2 | HEART RATE: 70 BPM | WEIGHT: 146.4 LBS

## 2019-10-11 DIAGNOSIS — I10 ESSENTIAL HYPERTENSION: Primary | ICD-10-CM

## 2019-10-11 NOTE — PROGRESS NOTES
Patient came into the office today for a blood pressure check. Patient states that she is taking Metoprolol Succinate 25 mg. B/P 137/84 Per Dr. Shelley Guerreor no changes. Return to the office in 1 month for repeat blood pressure check.

## 2019-10-13 LAB
ALBUMIN SERPL-MCNC: 4.9 G/DL (ref 3.6–4.8)
ALBUMIN/GLOB SERPL: 1.8 {RATIO} (ref 1.2–2.2)
ALP SERPL-CCNC: 66 IU/L (ref 39–117)
ALT SERPL-CCNC: 23 IU/L (ref 0–32)
AST SERPL-CCNC: 50 IU/L (ref 0–40)
BASOPHILS # BLD AUTO: 0.1 X10E3/UL (ref 0–0.2)
BASOPHILS NFR BLD AUTO: 1 %
BILIRUB SERPL-MCNC: 0.6 MG/DL (ref 0–1.2)
BUN SERPL-MCNC: 14 MG/DL (ref 8–27)
BUN/CREAT SERPL: 19 (ref 12–28)
CALCIUM SERPL-MCNC: 10.1 MG/DL (ref 8.7–10.3)
CCP IGA+IGG SERPL IA-ACNC: 7 UNITS (ref 0–19)
CHLORIDE SERPL-SCNC: 102 MMOL/L (ref 96–106)
CHOLEST SERPL-MCNC: 192 MG/DL (ref 100–199)
CO2 SERPL-SCNC: 25 MMOL/L (ref 20–29)
CREAT SERPL-MCNC: 0.72 MG/DL (ref 0.57–1)
EOSINOPHIL # BLD AUTO: 0.1 X10E3/UL (ref 0–0.4)
EOSINOPHIL NFR BLD AUTO: 3 %
ERYTHROCYTE [DISTWIDTH] IN BLOOD BY AUTOMATED COUNT: 13.1 % (ref 12.3–15.4)
EST. AVERAGE GLUCOSE BLD GHB EST-MCNC: 134 MG/DL
GLOBULIN SER CALC-MCNC: 2.8 G/DL (ref 1.5–4.5)
GLUCOSE SERPL-MCNC: 126 MG/DL (ref 65–99)
HBA1C MFR BLD: 6.3 % (ref 4.8–5.6)
HCT VFR BLD AUTO: 40 % (ref 34–46.6)
HDLC SERPL-MCNC: 51 MG/DL
HGB BLD-MCNC: 13.1 G/DL (ref 11.1–15.9)
IMM GRANULOCYTES # BLD AUTO: 0 X10E3/UL (ref 0–0.1)
IMM GRANULOCYTES NFR BLD AUTO: 0 %
LDLC SERPL CALC-MCNC: 122 MG/DL (ref 0–99)
LYMPHOCYTES # BLD AUTO: 2.5 X10E3/UL (ref 0.7–3.1)
LYMPHOCYTES NFR BLD AUTO: 47 %
MCH RBC QN AUTO: 29.6 PG (ref 26.6–33)
MCHC RBC AUTO-ENTMCNC: 32.8 G/DL (ref 31.5–35.7)
MCV RBC AUTO: 91 FL (ref 79–97)
MONOCYTES # BLD AUTO: 0.5 X10E3/UL (ref 0.1–0.9)
MONOCYTES NFR BLD AUTO: 10 %
NEUTROPHILS # BLD AUTO: 2.1 X10E3/UL (ref 1.4–7)
NEUTROPHILS NFR BLD AUTO: 39 %
PLATELET # BLD AUTO: 261 X10E3/UL (ref 150–450)
POTASSIUM SERPL-SCNC: 4.4 MMOL/L (ref 3.5–5.2)
PROT SERPL-MCNC: 7.7 G/DL (ref 6–8.5)
RBC # BLD AUTO: 4.42 X10E6/UL (ref 3.77–5.28)
RHEUMATOID FACT SERPL-ACNC: <10 IU/ML (ref 0–13.9)
SODIUM SERPL-SCNC: 143 MMOL/L (ref 134–144)
TRIGL SERPL-MCNC: 97 MG/DL (ref 0–149)
TSH SERPL DL<=0.005 MIU/L-ACNC: 1.92 UIU/ML (ref 0.45–4.5)
VLDLC SERPL CALC-MCNC: 19 MG/DL (ref 5–40)
WBC # BLD AUTO: 5.3 X10E3/UL (ref 3.4–10.8)

## 2020-01-02 ENCOUNTER — OFFICE VISIT (OUTPATIENT)
Dept: INTERNAL MEDICINE CLINIC | Age: 70
End: 2020-01-02

## 2020-01-02 VITALS
BODY MASS INDEX: 25.06 KG/M2 | RESPIRATION RATE: 16 BRPM | DIASTOLIC BLOOD PRESSURE: 83 MMHG | WEIGHT: 146.8 LBS | OXYGEN SATURATION: 97 % | TEMPERATURE: 97.3 F | SYSTOLIC BLOOD PRESSURE: 144 MMHG | HEIGHT: 64 IN | HEART RATE: 82 BPM

## 2020-01-02 DIAGNOSIS — K76.0 FATTY LIVER: ICD-10-CM

## 2020-01-02 DIAGNOSIS — I10 ESSENTIAL HYPERTENSION: Primary | ICD-10-CM

## 2020-01-02 DIAGNOSIS — E11.9 TYPE 2 DIABETES MELLITUS WITHOUT COMPLICATION, WITHOUT LONG-TERM CURRENT USE OF INSULIN (HCC): ICD-10-CM

## 2020-01-02 RX ORDER — TELMISARTAN AND HYDROCHLORTHIAZIDE 80; 12.5 MG/1; MG/1
1 TABLET ORAL DAILY
Qty: 90 TAB | Refills: 2 | Status: SHIPPED | OUTPATIENT
Start: 2020-01-02 | End: 2020-10-16 | Stop reason: SDUPTHER

## 2020-01-02 RX ORDER — BACITRACIN ZINC 500 UNIT/G
OINTMENT (GRAM) TOPICAL 2 TIMES DAILY
Qty: 15 G | Refills: 11 | Status: SHIPPED | OUTPATIENT
Start: 2020-01-02 | End: 2020-10-01

## 2020-01-02 NOTE — ASSESSMENT & PLAN NOTE
Stable, based on history, physical exam and review of pertinent labs, studies and medications; meds reconciled; continue current treatment plan. Key Antihyperglycemic Medications             metFORMIN ER (GLUCOPHAGE XR) 500 mg tablet (Taking) Take 2 Tabs by mouth daily (with dinner). Other Key Diabetic Medications             pravastatin (PRAVACHOL) 40 mg tablet (Taking) TAKE 1 TABLET BY MOUTH EVERY EVENING    telmisartan (MICARDIS) 80 mg tablet (Taking) Take 1 Tab by mouth daily.         Lab Results   Component Value Date/Time    Hemoglobin A1c 6.3 10/11/2019 12:00 AM    Glucose 126 10/11/2019 12:00 AM    Creatinine 0.72 10/11/2019 12:00 AM    Microalb/Creat ratio (ug/mg creat.) 5.4 08/01/2018 05:20 PM    Cholesterol, total 192 10/11/2019 12:00 AM    HDL Cholesterol 51 10/11/2019 12:00 AM    LDL, calculated 122 10/11/2019 12:00 AM    Triglyceride 97 10/11/2019 12:00 AM     Diabetic Foot and Eye Exam HM Status   Topic Date Due    Eye Exam  03/16/2017    Diabetic Foot Care  03/07/2019

## 2020-01-02 NOTE — PROGRESS NOTES
SPORTS MEDICINE AND PRIMARY CARE  Mike Fox MD, [address] [id number]  Phone:  [phone]  Fax: [phone]       Chief Complaint   Patient presents with    Toe Pain     right foot    . SUBJECTIVE:    Fatou Anderson is a 81 y.o. female Patient returns today with known history of DM type 2, primary hypertension and fatty liver. Since we last saw her, this morning she put something on her toe and now removed the superficial layer of skin on the pinky toe. Patient is concerned and came in for evaluation. She states her friend went to Dr. Tru Acevedo and wonders if she can have a similar referral.           Current Outpatient Medications   Medication Sig Dispense Refill    telmisartan-hydroCHLOROthiazide (MICARDIS HCT) 80-12.5 mg per tablet Take 1 Tab by mouth daily. 90 Tab 2    bacitracin zinc (BACITRACIN) ointment Apply  to affected area two (2) times a day. 15 g 11    pravastatin (PRAVACHOL) 40 mg tablet TAKE 1 TABLET BY MOUTH EVERY EVENING 90 Tab 3    metFORMIN ER (GLUCOPHAGE XR) 500 mg tablet Take 2 Tabs by mouth daily (with dinner). 180 Tab 11    glucose blood VI test strips (CONTOUR NEXT TEST STRIPS) strip USE TO TEST BLOOD SUGAR DAILY 150 Strip 11    metoprolol succinate (TOPROL-XL) 25 mg XL tablet Take 1 Tab by mouth daily. 30 Tab 11    Lancets (MICROLET LANCET) misc Use to test blood sugar once daily. Dx.e11.9 100 Each 11     Past Medical History:   Diagnosis Date    Bereavement 2018    mother  copd,chf    Bursitis of knee     Chest pain     Diabetes ([address]     Fatty liver 2011        Gall bladder stones 2014    Hypertension     LBP (low back pain)     Pes anserinus bursitis of left knee     S/P colonoscopy     Tenosynovitis of thumb 16     No past surgical history on file.   Allergies   Allergen Reactions    Pcn [Penicillins] Unknown (comments)         REVIEW OF SYSTEMS:  General: negative for - chills or fever  ENT: negative for - headaches, nasal congestion or tinnitus  Respiratory: negative for - cough, hemoptysis, shortness of breath or wheezing  Cardiovascular : negative for - chest pain, edema, palpitations or shortness of breath  Gastrointestinal: negative for - abdominal pain, blood in stools, heartburn or nausea/vomiting  Genito-Urinary: no dysuria, trouble voiding, or hematuria  Musculoskeletal: negative for - gait disturbance, joint pain, joint stiffness or joint swelling  Neurological: no TIA or stroke symptoms  Hematologic: no bruises, no bleeding, no swollen glands  Integument: no lumps, mole changes, nail changes or rash  Endocrine: no malaise/lethargy or unexpected weight changes      Social History     Socioeconomic History    Marital status:      Spouse name: Not on file    Number of children: Not on file    Years of education: Not on file    Highest education level: Not on file   Tobacco Use    Smoking status: Never Smoker    Smokeless tobacco: Never Used   Substance and Sexual Activity    Alcohol use: No    Drug use: No    Sexual activity: Yes     Partners: Male   Social History Narrative    m85  chf copd  fpased,prostate ca        Social History: Alcohol Use Patient uses alcohol, Drinks per occasion: 2, Drinks per w [place]: 0. Smoking Status Patient is a never smoker. Marital Status: , . Lives w ith: spouse who has PTSD. 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 Education/School: has highschool diploma, has college diploma. Buddhism: [employer] P[employer].      Family History   Problem Relation Age of Onset    Cancer Father        OBJECTIVE:    Visit Vitals  /83   Pulse 82   Temp 97.3 °F (36.3 °C) (Oral)   Resp 16   Ht 5' 4\" (1.626 m)   Wt 146 lb 12.8 oz (66.6 kg)   SpO2 97%   BMI 25.20 kg/m²     CONSTITUTIONAL: well , well nourished, appears age appropriate  EYES: perrla, eom intact  ENMT:moist mucous membranes, pharynx clear  NECK: supple. Thyroid normal  RESPIRATORY: Chest: clear bilaterally   CARDIOVASCULAR: Heart: regular rate and rhythm  GASTROINTESTINAL: Abdomen: soft, bowel sounds active  HEMATOLOGIC: no pathological lymph nodes palpated  MUSCULOSKELETAL: Extremities: no edema, pulse 1+   INTEGUMENT: No unusual rashes or suspicious skin lesions noted. Nails appear normal.  NEUROLOGIC: non-focal exam   MENTAL STATUS: alert and oriented, appropriate affect           ASSESSMENT:  1. Essential hypertension    2. Type 2 diabetes mellitus without complication, without long-term current use of insulin ([facility] [address]    3. Fatty liver       Dictation on: 01/02/2020  5:08 PM by: Pavan Ignacio [[id number]]     Blood pressure control is lacking. We would like to see it in the 120s or less. Will add Hydrochlorothiazide to the Micardis 80/12.5. She will be back in two weeks for us to look at the toe, as well as for us to evaluate her blood pressure. Blood sugar control has been excellent with hemoglobin A1c 6.3 on last visit. Will check hemoglobin A1c on next visit. No change in fatty liver. She will be back to see us in two weeks. I have discussed the diagnosis with the patient and the intended plan as seen in the  orders above. The patient understands and agees with the plan. The patient has   received an after visit summary and questions were answered concerning  future plans  Patient labs and/or xrays were reviewed  Past records were reviewed. PLAN:  .  Orders Placed This Encounter    REFERRAL TO PODIATRY    telmisartan-hydroCHLOROthiazide (MICARDIS HCT) 80-12.5 mg per tablet    bacitracin zinc (BACITRACIN) ointment       Follow-up and Dispositions    · Return in about 2 weeks (around 1/16/2020). ATTENTION:   This medical record was transcribed using an electronic medical records system. Although proofread, it may and can contain electronic and spelling errors.   Other human spelling and other errors may be present. Corrections may be executed at a later time. Please feel free to contact us for any clarifications as needed.

## 2020-01-02 NOTE — PROGRESS NOTES
SPORTS MEDICINE AND PRIMARY CARE  Kofi Isidro MD, [place], [address] [address]  Phone:  [phone]  Fax: [phone]       Chief Complaint   Patient presents with    Toe Pain     right foot    . SUBJECTIVE:    Vernon Mejia is a 81 y.o. female   Dictation on: 2020  4:54 PM by: [facility]     Patient states that she had a sore on her toe. She tried some OTC remedies and it did not improve. She got concerned and came in to see us. We recall that she has a known history of diabetes mellitus, primary hypertension, fatty liver, and is seen for evaluation. Current Outpatient Medications   Medication Sig Dispense Refill    telmisartan-hydroCHLOROthiazide (MICARDIS HCT) 80-12.5 mg per tablet Take 1 Tab by mouth daily. 90 Tab 2    bacitracin zinc (BACITRACIN) ointment Apply  to affected area two (2) times a day. 15 g 11    pravastatin (PRAVACHOL) 40 mg tablet TAKE 1 TABLET BY MOUTH EVERY EVENING 90 Tab 3    metFORMIN ER (GLUCOPHAGE XR) 500 mg tablet Take 2 Tabs by mouth daily (with dinner). 180 Tab 11    glucose blood VI test strips (CONTOUR NEXT TEST STRIPS) strip USE TO TEST BLOOD SUGAR DAILY 150 Strip 11    metoprolol succinate (TOPROL-XL) 25 mg XL tablet Take 1 Tab by mouth daily. 30 Tab 11    Lancets (MICROLET LANCET) misc Use to test blood sugar once daily. Dx.e11.9 100 Each 11     Past Medical History:   Diagnosis Date    Bereavement 2018    mother  copd,chf    Bursitis of knee     Chest pain     Diabetes ([facility] [address]     Fatty liver 2011        Gall bladder stones 2014    Hypertension     LBP (low back pain)     Pes anserinus bursitis of left knee     S/P colonoscopy     Tenosynovitis of thumb 16     No past surgical history on file.   Allergies   Allergen Reactions    Pcn [Penicillins] Unknown (comments)         REVIEW OF SYSTEMS:  General: negative for - chills or fever  ENT: negative for - headaches, nasal congestion or tinnitus  Respiratory: negative for - cough, hemoptysis, shortness of breath or wheezing  Cardiovascular : negative for - chest pain, edema, palpitations or shortness of breath  Gastrointestinal: negative for - abdominal pain, blood in stools, heartburn or nausea/vomiting  Genito-Urinary: no dysuria, trouble voiding, or hematuria  Musculoskeletal: negative for - gait disturbance, joint pain, joint stiffness or joint swelling  Neurological: no TIA or stroke symptoms  Hematologic: no bruises, no bleeding, no swollen glands  Integument: no lumps, mole changes, nail changes or rash  Endocrine: no malaise/lethargy or unexpected weight changes      Social History     Socioeconomic History    Marital status:      Spouse name: Not on file    Number of children: Not on file    Years of education: Not on file    Highest education level: Not on file   Tobacco Use    Smoking status: Never Smoker    Smokeless tobacco: Never Used   Substance and Sexual Activity    Alcohol use: No    Drug use: No    Sexual activity: Yes     Partners: Male   Social History Narrative    m85  chf copd  fpased,prostate ca        Social History: Alcohol Use Patient uses alcohol, Drinks per occasion: 2, Drinks per w [place]: 0. Smoking Status Patient is a never smoker. Marital Status: , . Lives w ith: spouse who has PTSD. 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 Education/School: has highschool diploma, has college diploma. Mandaen: [employer] Welsh. Family History   Problem Relation Age of Onset    Cancer Father        OBJECTIVE:    Visit Vitals  /83   Pulse 82   Temp 97.3 °F (36.3 °C) (Oral)   Resp 16   Ht 5' 4\" (1.626 m)   Wt 146 lb 12.8 oz (66.6 kg)   SpO2 97%   BMI 25.20 kg/m²     CONSTITUTIONAL: well , well nourished, appears age appropriate  EYES: perrla, eom intact  ENMT:moist mucous membranes, pharynx clear  NECK: supple.  Thyroid normal  RESPIRATORY: Chest: clear bilaterally   CARDIOVASCULAR: Heart: regular rate and rhythm  GASTROINTESTINAL: Abdomen: soft, bowel sounds active  HEMATOLOGIC: no pathological lymph nodes palpated  MUSCULOSKELETAL: Extremities: no edema, pulse 1+   INTEGUMENT: No unusual rashes or suspicious skin lesions noted. Nails appear normal.  NEUROLOGIC: non-focal exam   MENTAL STATUS: alert and oriented, appropriate affect           ASSESSMENT:  1. Essential hypertension    2. Type 2 diabetes mellitus without complication, without long-term current use of insulin ([facility] [address]    3. Fatty liver       Dictation on: 01/02/2020  4:59 PM by: Frannie Syed [[id number]]     For the lesion on her toe will add Bacitracin ointment and a small bandaid until she sees Dr. Tiffany Gillis. BP control is lacking. She is taking a BP pill that we are not aware of and will call in tomorrow with that name. In the meantime will place her on Micardis HCT 12.5 and come back in two weeks so we can look at her toe, as well as look at her BP. Blood sugar control has been excellent with hemoglobin A1c of 6.3 and on her next visit will check hemoglobin A1c. Fatty liver is unchanged. She will be back to see us in two weeks, sooner if needed. I have discussed the diagnosis with the patient and the intended plan as seen in the  orders above. The patient understands and agees with the plan. The patient has   received an after visit summary and questions were answered concerning  future plans  Patient labs and/or xrays were reviewed  Past records were reviewed. PLAN:  .  Orders Placed This Encounter    REFERRAL TO PODIATRY    telmisartan-hydroCHLOROthiazide (MICARDIS HCT) 80-12.5 mg per tablet    bacitracin zinc (BACITRACIN) ointment       Follow-up and Dispositions    · Return in about 2 weeks (around 1/16/2020). ATTENTION:   This medical record was transcribed using an electronic medical records system.   Although proofread, it may and can contain electronic and spelling errors. Other human spelling and other errors may be present. Corrections may be executed at a later time. Please feel free to contact us for any clarifications as needed.

## 2020-01-02 NOTE — PROGRESS NOTES
Chief Complaint   Patient presents with    Toe Pain     right foot      1. Have you been to the ER, urgent care clinic since your last visit? Hospitalized since your last visit? No    2. Have you seen or consulted any other health care providers outside of the [address] [place] [address] since your last visit? Include any pap smears or colon screening.  No

## 2020-01-15 ENCOUNTER — CLINICAL SUPPORT (OUTPATIENT)
Dept: INTERNAL MEDICINE CLINIC | Age: 70
End: 2020-01-15

## 2020-01-15 VITALS
DIASTOLIC BLOOD PRESSURE: 79 MMHG | SYSTOLIC BLOOD PRESSURE: 126 MMHG | HEART RATE: 98 BPM | WEIGHT: 146.5 LBS | BODY MASS INDEX: 25.15 KG/M2

## 2020-01-15 DIAGNOSIS — I10 ESSENTIAL HYPERTENSION: Primary | ICD-10-CM

## 2020-01-15 NOTE — PROGRESS NOTES
Pt came in today for a bp check. Pt states she is currently taking  Metoprolol Succinate 25 mg 1 tab PO daily  Telmisartan-Hydrochlorothiazide 80-12.5 mg 1 tab PO daily  Weight-146.5lb  Pulse-98  BP-126/79  Per  pt is to continue taking bp meds as directed above with no changes and return at next scheduled appointment with Dr. [place].      Ludin Belle

## 2020-02-25 RX ORDER — METOPROLOL SUCCINATE 25 MG/1
25 TABLET, EXTENDED RELEASE ORAL DAILY
Qty: 30 TAB | Refills: 11 | Status: SHIPPED | OUTPATIENT
Start: 2020-02-25 | End: 2021-02-28

## 2020-03-03 ENCOUNTER — OFFICE VISIT (OUTPATIENT)
Dept: INTERNAL MEDICINE CLINIC | Age: 70
End: 2020-03-03

## 2020-03-03 VITALS
HEIGHT: 64 IN | SYSTOLIC BLOOD PRESSURE: 129 MMHG | WEIGHT: 145.8 LBS | DIASTOLIC BLOOD PRESSURE: 76 MMHG | OXYGEN SATURATION: 96 % | TEMPERATURE: 97.7 F | BODY MASS INDEX: 24.89 KG/M2 | RESPIRATION RATE: 16 BRPM | HEART RATE: 84 BPM

## 2020-03-03 DIAGNOSIS — K76.0 FATTY LIVER: ICD-10-CM

## 2020-03-03 DIAGNOSIS — I10 ESSENTIAL HYPERTENSION: Primary | ICD-10-CM

## 2020-03-03 DIAGNOSIS — E11.9 TYPE 2 DIABETES MELLITUS WITHOUT COMPLICATION, WITHOUT LONG-TERM CURRENT USE OF INSULIN (HCC): ICD-10-CM

## 2020-03-03 NOTE — ASSESSMENT & PLAN NOTE
Stable, based on history, physical exam and review of pertinent labs, studies and medications; meds reconciled; continue current treatment plan. Key Antihyperglycemic Medications             metFORMIN ER (GLUCOPHAGE XR) 500 mg tablet (Taking) Take 2 Tabs by mouth daily (with dinner). Other Key Diabetic Medications             telmisartan-hydroCHLOROthiazide (MICARDIS HCT) 80-12.5 mg per tablet (Taking) Take 1 Tab by mouth daily.     pravastatin (PRAVACHOL) 40 mg tablet (Taking) TAKE 1 TABLET BY MOUTH EVERY EVENING        Lab Results   Component Value Date/Time    Hemoglobin A1c 6.3 10/11/2019 12:00 AM    Glucose 126 10/11/2019 12:00 AM    Creatinine 0.72 10/11/2019 12:00 AM    Cholesterol, total 192 10/11/2019 12:00 AM    HDL Cholesterol 51 10/11/2019 12:00 AM    LDL, calculated 122 10/11/2019 12:00 AM    Triglyceride 97 10/11/2019 12:00 AM     Diabetic Foot and Eye Exam HM Status   Topic Date Due    Eye Exam  03/16/2017    Diabetic Foot Care  03/07/2019

## 2020-03-03 NOTE — ACP (ADVANCE CARE PLANNING)

## 2020-03-03 NOTE — PROGRESS NOTES
SPORTS MEDICINE AND PRIMARY CARE  Jay Raygoza MD, [address] [address] [facility] [address]  Phone:  [phone]  Fax: [phone]       Chief Complaint   Patient presents with    Pre-op Exam     Patient is here for a Pre op. Patient states she is having eye surgrey on March the 12th of 2020. [place] SUBJECTIVE:    Cheli Valentine is a 81 y.o. female Patient returns today for a preoperative history and physical examination to remove cataract and implantation of intraocular lens on 20 by Dr. Deandre Roman or associate. Patient has a known history of diabetes, hypertension, fatty liver, and is seen for evaluation. Current Outpatient Medications   Medication Sig Dispense Refill    metoprolol succinate (TOPROL-XL) 25 mg XL tablet Take 1 Tab by mouth daily. 30 Tab 11    telmisartan-hydroCHLOROthiazide (MICARDIS HCT) 80-12.5 mg per tablet Take 1 Tab by mouth daily. 90 Tab 2    bacitracin zinc (BACITRACIN) ointment Apply  to affected area two (2) times a day. 15 g 11    pravastatin (PRAVACHOL) 40 mg tablet TAKE 1 TABLET BY MOUTH EVERY EVENING 90 Tab 3    metFORMIN ER (GLUCOPHAGE XR) 500 mg tablet Take 2 Tabs by mouth daily (with dinner). 180 Tab 11    glucose blood VI test strips (CONTOUR NEXT TEST STRIPS) strip USE TO TEST BLOOD SUGAR DAILY 150 Strip 11    Lancets (MICROLET LANCET) misc Use to test blood sugar once daily. Dx.e11.9 100 Each 11     Past Medical History:   Diagnosis Date    Bereavement 2018    mother  copd,chf    Bursitis of knee     Chest pain     Diabetes ([address]     Fatty liver 2011        Gall bladder stones 2014    Hypertension     LBP (low back pain)     Pes anserinus bursitis of left knee     S/P colonoscopy     Tenosynovitis of thumb 16     No past surgical history on file.   Allergies   Allergen Reactions    Pcn [Penicillins] Unknown (comments)         REVIEW OF SYSTEMS:  General: negative for - chills or fever  ENT: negative for - headaches, nasal congestion or tinnitus  Respiratory: negative for - cough, hemoptysis, shortness of breath or wheezing  Cardiovascular : negative for - chest pain, edema, palpitations or shortness of breath  Gastrointestinal: negative for - abdominal pain, blood in stools, heartburn or nausea/vomiting  Genito-Urinary: no dysuria, trouble voiding, or hematuria  Musculoskeletal: negative for - gait disturbance, joint pain, joint stiffness or joint swelling  Neurological: no TIA or stroke symptoms  Hematologic: no bruises, no bleeding, no swollen glands  Integument: no lumps, mole changes, nail changes or rash  Endocrine: no malaise/lethargy or unexpected weight changes      Social History     Socioeconomic History    Marital status:      Spouse name: Not on file    Number of children: Not on file    Years of education: Not on file    Highest education level: Not on file   Tobacco Use    Smoking status: Never Smoker    Smokeless tobacco: Never Used   Substance and Sexual Activity    Alcohol use: No    Drug use: No    Sexual activity: Yes     Partners: Male   Social History Narrative    m85  chf copd  fpased,prostate ca        Social History: Alcohol Use Patient uses alcohol, Drinks per occasion: 2, Drinks per w [place]: 0. Smoking Status Patient is a never smoker. Marital Status: , . Lives w ith: spouse who has PTSD. 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 Education/School: has highschool diploma, has college diploma. Protestant: [employer] Ivorian. Family History   Problem Relation Age of Onset    Cancer Father        OBJECTIVE:    Visit Vitals  /76   Pulse 84   Temp 97.7 °F (36.5 °C)   Resp 16   Ht 5' 4\" (1.626 m)   Wt 145 lb 12.8 oz (66.1 kg)   SpO2 96%   BMI 25.03 kg/m²     CONSTITUTIONAL: well , well nourished, appears age appropriate  EYES: perrla, eom intact  ENMT:moist mucous membranes, pharynx clear  NECK: supple. Thyroid normal  RESPIRATORY: Chest: clear bilaterally   CARDIOVASCULAR: Heart: regular rate and rhythm  GASTROINTESTINAL: Abdomen: soft, bowel sounds active  HEMATOLOGIC: no pathological lymph nodes palpated  MUSCULOSKELETAL: Extremities: no edema, pulse 1+   INTEGUMENT: No unusual rashes or suspicious skin lesions noted. Nails appear normal.  NEUROLOGIC: non-focal exam   MENTAL STATUS: alert and oriented, appropriate affect           ASSESSMENT:  1. Essential hypertension    2. Fatty liver    3. Type 2 diabetes mellitus without complication, without long-term current use of insulin (HCC)      Blood pressure control is at goal.  She is at her ideal body weight. We remain concerned about her fatty liver, last LFT was slightly elevated. If it remains elevated I think she should see the hepatologist.    Will assess blood sugar control with hemoglobin A1c. She will be back to see us as scheduled. At that time she prefers to have blood work done. I have discussed the diagnosis with the patient and the intended plan as seen in the  orders above. The patient understands and agees with the plan. The patient has   received an after visit summary and questions were answered concerning  future plans  Patient labs and/or xrays were reviewed  Past records were reviewed. PLAN:  .  Orders Placed This Encounter    HEMOGLOBIN A1C WITH EAG    HEPATIC FUNCTION PANEL                  ATTENTION:   This medical record was transcribed using an electronic medical records system. Although proofread, it may and can contain electronic and spelling errors. Other human spelling and other errors may be present. Corrections may be executed at a later time. Please feel free to contact us for any clarifications as needed.

## 2020-03-03 NOTE — PROGRESS NOTES
Chief Complaint   Patient presents with    Pre-op Exam     Patient is here for a Pre op. Patient states she is having eye surgrey on March the 12th of 2020.      1. Have you been to the ER, urgent care clinic since your last visit? Hospitalized since your last visit? No    2. Have you seen or consulted any other health care providers outside of the [facility] since your last visit? Include any pap smears or colon screening.  No

## 2020-06-02 ENCOUNTER — OFFICE VISIT (OUTPATIENT)
Dept: INTERNAL MEDICINE CLINIC | Age: 70
End: 2020-06-02

## 2020-06-02 VITALS
TEMPERATURE: 97.9 F | OXYGEN SATURATION: 96 % | RESPIRATION RATE: 18 BRPM | HEART RATE: 84 BPM | SYSTOLIC BLOOD PRESSURE: 127 MMHG | DIASTOLIC BLOOD PRESSURE: 86 MMHG | BODY MASS INDEX: 24.43 KG/M2 | HEIGHT: 64 IN | WEIGHT: 143.1 LBS

## 2020-06-02 DIAGNOSIS — E11.9 TYPE 2 DIABETES MELLITUS WITHOUT COMPLICATION, WITHOUT LONG-TERM CURRENT USE OF INSULIN (HCC): Primary | ICD-10-CM

## 2020-06-02 DIAGNOSIS — M70.52 PES ANSERINUS BURSITIS OF LEFT KNEE: ICD-10-CM

## 2020-06-02 DIAGNOSIS — I10 ESSENTIAL HYPERTENSION: ICD-10-CM

## 2020-06-02 DIAGNOSIS — K76.0 FATTY LIVER: ICD-10-CM

## 2020-06-02 DIAGNOSIS — R07.89 OTHER CHEST PAIN: ICD-10-CM

## 2020-06-02 DIAGNOSIS — Z12.31 ENCOUNTER FOR SCREENING MAMMOGRAM FOR MALIGNANT NEOPLASM OF BREAST: ICD-10-CM

## 2020-06-02 NOTE — PROGRESS NOTES
1. Have you been to the ER, urgent care clinic since your last visit? Hospitalized since your last visit? No    2. Have you seen or consulted any other health care providers outside of the [address] [place] [address] since your last visit? Include any pap smears or colon screening.  No
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
## 2020-06-03 LAB
ALBUMIN/CREAT UR: 6 MG/G CREAT (ref 0–29)
CREAT UR-MCNC: 103.8 MG/DL
EST. AVERAGE GLUCOSE BLD GHB EST-MCNC: 154 MG/DL
HBA1C MFR BLD: 7 % (ref 4.8–5.6)
MICROALBUMIN UR-MCNC: 6.3 UG/ML

## 2020-08-10 RX ORDER — METFORMIN HYDROCHLORIDE 500 MG/1
1000 TABLET, EXTENDED RELEASE ORAL
Qty: 180 TAB | Refills: 11 | Status: SHIPPED | OUTPATIENT
Start: 2020-08-10 | End: 2021-09-02

## 2020-09-07 RX ORDER — PRAVASTATIN SODIUM 40 MG/1
TABLET ORAL
Qty: 90 TAB | Refills: 3 | Status: SHIPPED | OUTPATIENT
Start: 2020-09-07

## 2020-09-09 RX ORDER — LANCETS
EACH MISCELLANEOUS
Qty: 100 EACH | Refills: 11 | Status: SHIPPED | OUTPATIENT
Start: 2020-09-09

## 2020-09-09 RX ORDER — BLOOD SUGAR DIAGNOSTIC
STRIP MISCELLANEOUS
Qty: 150 STRIP | Refills: 11 | Status: SHIPPED | OUTPATIENT
Start: 2020-09-09

## 2020-10-01 ENCOUNTER — OFFICE VISIT (OUTPATIENT)
Dept: INTERNAL MEDICINE CLINIC | Age: 70
End: 2020-10-01
Payer: MEDICARE

## 2020-10-01 VITALS
SYSTOLIC BLOOD PRESSURE: 123 MMHG | HEIGHT: 64 IN | HEART RATE: 83 BPM | OXYGEN SATURATION: 97 % | RESPIRATION RATE: 14 BRPM | BODY MASS INDEX: 23.61 KG/M2 | WEIGHT: 138.3 LBS | TEMPERATURE: 98.3 F | DIASTOLIC BLOOD PRESSURE: 76 MMHG

## 2020-10-01 DIAGNOSIS — M70.52 PES ANSERINUS BURSITIS OF LEFT KNEE: ICD-10-CM

## 2020-10-01 DIAGNOSIS — Z12.31 ENCOUNTER FOR SCREENING MAMMOGRAM FOR MALIGNANT NEOPLASM OF BREAST: ICD-10-CM

## 2020-10-01 DIAGNOSIS — K76.0 FATTY LIVER: ICD-10-CM

## 2020-10-01 DIAGNOSIS — M79.602 PAIN IN INFERIOR LEFT UPPER EXTREMITY: ICD-10-CM

## 2020-10-01 DIAGNOSIS — I10 ESSENTIAL HYPERTENSION: ICD-10-CM

## 2020-10-01 DIAGNOSIS — M79.602 LEFT ARM PAIN: ICD-10-CM

## 2020-10-01 DIAGNOSIS — E11.9 TYPE 2 DIABETES MELLITUS WITHOUT COMPLICATION, WITHOUT LONG-TERM CURRENT USE OF INSULIN (HCC): Primary | ICD-10-CM

## 2020-10-01 PROCEDURE — 2022F DILAT RTA XM EVC RTNOPTHY: CPT | Performed by: INTERNAL MEDICINE

## 2020-10-01 PROCEDURE — 3017F COLORECTAL CA SCREEN DOC REV: CPT | Performed by: INTERNAL MEDICINE

## 2020-10-01 PROCEDURE — 36415 COLL VENOUS BLD VENIPUNCTURE: CPT | Performed by: INTERNAL MEDICINE

## 2020-10-01 PROCEDURE — G8420 CALC BMI NORM PARAMETERS: HCPCS | Performed by: INTERNAL MEDICINE

## 2020-10-01 PROCEDURE — 3051F HG A1C>EQUAL 7.0%<8.0%: CPT | Performed by: INTERNAL MEDICINE

## 2020-10-01 PROCEDURE — 1090F PRES/ABSN URINE INCON ASSESS: CPT | Performed by: INTERNAL MEDICINE

## 2020-10-01 PROCEDURE — G8754 DIAS BP LESS 90: HCPCS | Performed by: INTERNAL MEDICINE

## 2020-10-01 PROCEDURE — 99214 OFFICE O/P EST MOD 30 MIN: CPT | Performed by: INTERNAL MEDICINE

## 2020-10-01 PROCEDURE — G8510 SCR DEP NEG, NO PLAN REQD: HCPCS | Performed by: INTERNAL MEDICINE

## 2020-10-01 PROCEDURE — 1101F PT FALLS ASSESS-DOCD LE1/YR: CPT | Performed by: INTERNAL MEDICINE

## 2020-10-01 PROCEDURE — G8536 NO DOC ELDER MAL SCRN: HCPCS | Performed by: INTERNAL MEDICINE

## 2020-10-01 PROCEDURE — 73030 X-RAY EXAM OF SHOULDER: CPT | Performed by: INTERNAL MEDICINE

## 2020-10-01 PROCEDURE — G8400 PT W/DXA NO RESULTS DOC: HCPCS | Performed by: INTERNAL MEDICINE

## 2020-10-01 PROCEDURE — G8752 SYS BP LESS 140: HCPCS | Performed by: INTERNAL MEDICINE

## 2020-10-01 PROCEDURE — G9899 SCRN MAM PERF RSLTS DOC: HCPCS | Performed by: INTERNAL MEDICINE

## 2020-10-01 PROCEDURE — G8427 DOCREV CUR MEDS BY ELIG CLIN: HCPCS | Performed by: INTERNAL MEDICINE
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
## 2020-10-01 NOTE — PROGRESS NOTES
1. Have you been to the ER, urgent care clinic since your last visit? Hospitalized since your last visit? No    2. Have you seen or consulted any other health care providers outside of the [address] [place] [address] since your last visit? Include any pap smears or colon screening.  No

## 2020-10-02 LAB
ALBUMIN SERPL-MCNC: 4.8 G/DL (ref 3.8–4.8)
ALBUMIN/GLOB SERPL: 1.5 {RATIO} (ref 1.2–2.2)
ALP SERPL-CCNC: 61 IU/L (ref 39–117)
ALT SERPL-CCNC: 27 IU/L (ref 0–32)
APPEARANCE UR: ABNORMAL
AST SERPL-CCNC: 57 IU/L (ref 0–40)
BACTERIA #/AREA URNS HPF: ABNORMAL /[HPF]
BASOPHILS # BLD AUTO: 0 X10E3/UL (ref 0–0.2)
BASOPHILS NFR BLD AUTO: 1 %
BILIRUB SERPL-MCNC: 0.9 MG/DL (ref 0–1.2)
BILIRUB UR QL STRIP: NEGATIVE
BUN SERPL-MCNC: 16 MG/DL (ref 8–27)
BUN/CREAT SERPL: 19 (ref 12–28)
CALCIUM SERPL-MCNC: 10.6 MG/DL (ref 8.7–10.3)
CASTS URNS QL MICRO: ABNORMAL /LPF
CHLORIDE SERPL-SCNC: 101 MMOL/L (ref 96–106)
CHOLEST SERPL-MCNC: 191 MG/DL (ref 100–199)
CO2 SERPL-SCNC: 24 MMOL/L (ref 20–29)
COLOR UR: YELLOW
CREAT SERPL-MCNC: 0.83 MG/DL (ref 0.57–1)
EOSINOPHIL # BLD AUTO: 0.1 X10E3/UL (ref 0–0.4)
EOSINOPHIL NFR BLD AUTO: 2 %
EPI CELLS #/AREA URNS HPF: ABNORMAL /HPF (ref 0–10)
ERYTHROCYTE [DISTWIDTH] IN BLOOD BY AUTOMATED COUNT: 12.7 % (ref 11.7–15.4)
EST. AVERAGE GLUCOSE BLD GHB EST-MCNC: 131 MG/DL
GLOBULIN SER CALC-MCNC: 3.1 G/DL (ref 1.5–4.5)
GLUCOSE SERPL-MCNC: 118 MG/DL (ref 65–99)
GLUCOSE UR QL: NEGATIVE
HBA1C MFR BLD: 6.2 % (ref 4.8–5.6)
HCT VFR BLD AUTO: 42.8 % (ref 34–46.6)
HDLC SERPL-MCNC: 48 MG/DL
HGB BLD-MCNC: 13.6 G/DL (ref 11.1–15.9)
HGB UR QL STRIP: NEGATIVE
IMM GRANULOCYTES # BLD AUTO: 0 X10E3/UL (ref 0–0.1)
IMM GRANULOCYTES NFR BLD AUTO: 0 %
KETONES UR QL STRIP: NEGATIVE
LDLC SERPL CALC-MCNC: 120 MG/DL (ref 0–99)
LEUKOCYTE ESTERASE UR QL STRIP: ABNORMAL
LYMPHOCYTES # BLD AUTO: 2.6 X10E3/UL (ref 0.7–3.1)
LYMPHOCYTES NFR BLD AUTO: 51 %
MCH RBC QN AUTO: 29.8 PG (ref 26.6–33)
MCHC RBC AUTO-ENTMCNC: 31.8 G/DL (ref 31.5–35.7)
MCV RBC AUTO: 94 FL (ref 79–97)
MICRO URNS: ABNORMAL
MONOCYTES # BLD AUTO: 0.5 X10E3/UL (ref 0.1–0.9)
MONOCYTES NFR BLD AUTO: 10 %
MUCOUS THREADS URNS QL MICRO: PRESENT
NEUTROPHILS # BLD AUTO: 1.9 X10E3/UL (ref 1.4–7)
NEUTROPHILS NFR BLD AUTO: 36 %
NITRITE UR QL STRIP: NEGATIVE
PH UR STRIP: 5 [PH] (ref 5–7.5)
PLATELET # BLD AUTO: 272 X10E3/UL (ref 150–450)
POTASSIUM SERPL-SCNC: 4.2 MMOL/L (ref 3.5–5.2)
PROT SERPL-MCNC: 7.9 G/DL (ref 6–8.5)
PROT UR QL STRIP: NEGATIVE
RBC # BLD AUTO: 4.56 X10E6/UL (ref 3.77–5.28)
RBC #/AREA URNS HPF: ABNORMAL /HPF (ref 0–2)
SODIUM SERPL-SCNC: 141 MMOL/L (ref 134–144)
SP GR UR: 1.02 (ref 1–1.03)
TRIGL SERPL-MCNC: 130 MG/DL (ref 0–149)
TSH SERPL DL<=0.005 MIU/L-ACNC: 1.7 UIU/ML (ref 0.45–4.5)
UROBILINOGEN UR STRIP-MCNC: 0.2 MG/DL (ref 0.2–1)
VLDLC SERPL CALC-MCNC: 23 MG/DL (ref 5–40)
WBC # BLD AUTO: 5.2 X10E3/UL (ref 3.4–10.8)
WBC #/AREA URNS HPF: ABNORMAL /HPF (ref 0–5)

## 2020-10-07 ENCOUNTER — HOSPITAL ENCOUNTER (OUTPATIENT)
Dept: MAMMOGRAPHY | Age: 70
Discharge: HOME | End: 2020-10-07
Attending: INTERNAL MEDICINE
Payer: MEDICARE

## 2020-10-07 DIAGNOSIS — E11.9 TYPE 2 DIABETES MELLITUS WITHOUT COMPLICATION, WITHOUT LONG-TERM CURRENT USE OF INSULIN (HCC): ICD-10-CM

## 2020-10-07 DIAGNOSIS — Z12.31 ENCOUNTER FOR SCREENING MAMMOGRAM FOR MALIGNANT NEOPLASM OF BREAST: ICD-10-CM

## 2020-10-07 PROCEDURE — 77067 SCR MAMMO BI INCL CAD: CPT

## 2020-10-13 ENCOUNTER — HOSPITAL ENCOUNTER (OUTPATIENT)
Dept: MAMMOGRAPHY | Age: 70
Discharge: HOME | End: 2020-10-13
Attending: INTERNAL MEDICINE
Payer: MEDICARE

## 2020-10-13 DIAGNOSIS — R92.8 ABNORMAL MAMMOGRAM: ICD-10-CM

## 2020-10-13 PROCEDURE — 76642 ULTRASOUND BREAST LIMITED: CPT

## 2020-10-13 PROCEDURE — 77065 DX MAMMO INCL CAD UNI: CPT

## 2020-10-16 RX ORDER — TELMISARTAN AND HYDROCHLORTHIAZIDE 80; 12.5 MG/1; MG/1
1 TABLET ORAL DAILY
Qty: 90 TAB | Refills: 2 | Status: SHIPPED | OUTPATIENT
Start: 2020-10-16

## 2020-10-19 ENCOUNTER — HOSPITAL ENCOUNTER (OUTPATIENT)
Dept: MAMMOGRAPHY | Age: 70
Discharge: HOME | End: 2020-10-19
Attending: INTERNAL MEDICINE
Payer: MEDICARE

## 2020-10-19 DIAGNOSIS — R92.8 ABNORMAL MAMMOGRAM: ICD-10-CM

## 2020-10-19 DIAGNOSIS — N63.20 MASS OF BREAST, LEFT: ICD-10-CM

## 2020-10-19 PROCEDURE — 19000 PUNCTURE ASPIR CYST BREAST: CPT

## 2020-10-19 PROCEDURE — 74011000250 HC RX REV CODE- 250: Performed by: RADIOLOGY

## 2020-10-19 RX ORDER — LIDOCAINE HYDROCHLORIDE 10 MG/ML
5 INJECTION INFILTRATION; PERINEURAL
Status: COMPLETED | OUTPATIENT
Start: 2020-10-19 | End: 2020-10-19

## 2020-10-19 RX ORDER — LIDOCAINE HYDROCHLORIDE AND EPINEPHRINE 10; 10 MG/ML; UG/ML
10 INJECTION, SOLUTION INFILTRATION; PERINEURAL ONCE
Status: COMPLETED | OUTPATIENT
Start: 2020-10-19 | End: 2020-10-19

## 2020-10-19 RX ADMIN — LIDOCAINE HYDROCHLORIDE,EPINEPHRINE BITARTRATE 100 MG: 10; .01 INJECTION, SOLUTION INFILTRATION; PERINEURAL at 11:45

## 2020-10-19 RX ADMIN — LIDOCAINE HYDROCHLORIDE 5 ML: 10 INJECTION, SOLUTION INFILTRATION; PERINEURAL at 11:45

## 2020-10-19 NOTE — PROGRESS NOTES
Patient tolerated left breast cyst aspiration well with scant bleeding. Site was covered with a bandaid and patient was instructed to keep area clean and dry for 24 hours. She was advised to continue annual mammogram schedule. Encouraged her to call with any questions or concerns.

## 2021-02-28 RX ORDER — METOPROLOL SUCCINATE 25 MG/1
TABLET, EXTENDED RELEASE ORAL
Qty: 30 TAB | Refills: 11 | Status: SHIPPED | OUTPATIENT
Start: 2021-02-28

## 2021-07-21 LAB — HBA1C MFR BLD HPLC: 6.9 %

## 2021-09-02 RX ORDER — METFORMIN HYDROCHLORIDE 500 MG/1
TABLET, EXTENDED RELEASE ORAL
Qty: 180 TABLET | Refills: 11 | Status: SHIPPED | OUTPATIENT
Start: 2021-09-02

## 2021-10-22 ENCOUNTER — TRANSCRIBE ORDER (OUTPATIENT)
Dept: SCHEDULING | Age: 71
End: 2021-10-22

## 2021-10-22 DIAGNOSIS — Z12.31 BREAST CANCER SCREENING BY MAMMOGRAM: Primary | ICD-10-CM

## 2021-11-10 ENCOUNTER — HOSPITAL ENCOUNTER (OUTPATIENT)
Dept: MAMMOGRAPHY | Age: 71
Discharge: HOME | End: 2021-11-10
Attending: NURSE PRACTITIONER
Payer: MEDICARE

## 2021-11-10 DIAGNOSIS — Z12.31 BREAST CANCER SCREENING BY MAMMOGRAM: ICD-10-CM

## 2021-11-10 PROCEDURE — 77067 SCR MAMMO BI INCL CAD: CPT

## 2021-11-19 ENCOUNTER — TRANSCRIBE ORDER (OUTPATIENT)
Dept: SCHEDULING | Age: 71
End: 2021-11-19

## 2021-11-19 DIAGNOSIS — R92.8 ABNORMAL MAMMOGRAM OF RIGHT BREAST: Primary | ICD-10-CM

## 2021-12-08 ENCOUNTER — HOSPITAL ENCOUNTER (OUTPATIENT)
Dept: MAMMOGRAPHY | Age: 71
Discharge: HOME | End: 2021-12-08
Attending: NURSE PRACTITIONER
Payer: MEDICARE

## 2021-12-08 DIAGNOSIS — R92.8 ABNORMAL MAMMOGRAM OF RIGHT BREAST: ICD-10-CM

## 2021-12-08 PROCEDURE — 76642 ULTRASOUND BREAST LIMITED: CPT

## 2021-12-08 PROCEDURE — 77065 DX MAMMO INCL CAD UNI: CPT

## 2022-03-19 PROBLEM — Z63.4 BEREAVEMENT: Status: ACTIVE | Noted: 2018-03-16

## 2022-03-20 PROBLEM — M79.602 PAIN IN INFERIOR LEFT UPPER EXTREMITY: Status: ACTIVE | Noted: 2020-10-01

## 2022-06-08 ENCOUNTER — HOSPITAL ENCOUNTER (OUTPATIENT)
Dept: MAMMOGRAPHY | Age: 72
Discharge: HOME | End: 2022-06-08
Payer: MEDICARE

## 2022-06-08 DIAGNOSIS — R92.8 ABNORMAL MAMMOGRAM: ICD-10-CM

## 2022-06-08 PROCEDURE — 76642 ULTRASOUND BREAST LIMITED: CPT

## 2022-11-22 ENCOUNTER — TRANSCRIBE ORDER (OUTPATIENT)
Dept: SCHEDULING | Age: 72
End: 2022-11-22

## 2022-11-22 DIAGNOSIS — Z12.31 VISIT FOR SCREENING MAMMOGRAM: Primary | ICD-10-CM

## 2022-12-02 ENCOUNTER — OFFICE VISIT (OUTPATIENT)
Dept: INTERNAL MEDICINE CLINIC | Age: 72
End: 2022-12-02
Payer: MEDICARE

## 2022-12-02 VITALS
DIASTOLIC BLOOD PRESSURE: 79 MMHG | RESPIRATION RATE: 20 BRPM | OXYGEN SATURATION: 97 % | WEIGHT: 140.1 LBS | HEIGHT: 64 IN | BODY MASS INDEX: 23.92 KG/M2 | HEART RATE: 80 BPM | TEMPERATURE: 97.8 F | SYSTOLIC BLOOD PRESSURE: 153 MMHG

## 2022-12-02 DIAGNOSIS — Z13.820 SCREENING FOR OSTEOPOROSIS: ICD-10-CM

## 2022-12-02 DIAGNOSIS — I51.89 DIASTOLIC DYSFUNCTION: ICD-10-CM

## 2022-12-02 DIAGNOSIS — E11.9 TYPE 2 DIABETES MELLITUS WITHOUT COMPLICATION, WITHOUT LONG-TERM CURRENT USE OF INSULIN (HCC): Primary | ICD-10-CM

## 2022-12-02 DIAGNOSIS — K76.0 FATTY LIVER: ICD-10-CM

## 2022-12-02 DIAGNOSIS — I10 PRIMARY HYPERTENSION: ICD-10-CM

## 2022-12-02 DIAGNOSIS — Z78.0 POST-MENOPAUSAL: ICD-10-CM

## 2022-12-02 DIAGNOSIS — Z12.31 ENCOUNTER FOR SCREENING MAMMOGRAM FOR MALIGNANT NEOPLASM OF BREAST: ICD-10-CM

## 2022-12-02 DIAGNOSIS — E55.9 VITAMIN D DEFICIENCY, UNSPECIFIED: ICD-10-CM

## 2022-12-02 DIAGNOSIS — E78.5 DYSLIPIDEMIA: ICD-10-CM

## 2022-12-02 RX ORDER — ERGOCALCIFEROL 1.25 MG/1
50000 CAPSULE ORAL
COMMUNITY
End: 2022-12-03 | Stop reason: ALTCHOICE

## 2022-12-02 RX ORDER — GUAIFENESIN 100 MG/5ML
81 LIQUID (ML) ORAL DAILY
COMMUNITY

## 2022-12-02 RX ORDER — VALSARTAN AND HYDROCHLOROTHIAZIDE 320; 12.5 MG/1; MG/1
1 TABLET, FILM COATED ORAL DAILY
Qty: 90 TABLET | Refills: 3 | Status: SHIPPED | OUTPATIENT
Start: 2022-12-02
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
## 2022-12-03 LAB
25(OH)D3 SERPL-MCNC: 132.3 NG/ML (ref 30–100)
ALBUMIN SERPL-MCNC: 4.2 G/DL (ref 3.5–5)
ALBUMIN/GLOB SERPL: 1.1 {RATIO} (ref 1.1–2.2)
ALP SERPL-CCNC: 58 U/L (ref 45–117)
ALT SERPL-CCNC: 26 U/L (ref 12–78)
ANION GAP SERPL CALC-SCNC: 5 MMOL/L (ref 5–15)
APPEARANCE UR: ABNORMAL
AST SERPL-CCNC: 48 U/L (ref 15–37)
BACTERIA URNS QL MICRO: NEGATIVE /HPF
BASOPHILS # BLD: 0 K/UL (ref 0–0.1)
BASOPHILS NFR BLD: 1 % (ref 0–1)
BILIRUB SERPL-MCNC: 0.8 MG/DL (ref 0.2–1)
BILIRUB UR QL: NEGATIVE
BUN SERPL-MCNC: 16 MG/DL (ref 6–20)
BUN/CREAT SERPL: 18 (ref 12–20)
CALCIUM SERPL-MCNC: 10.1 MG/DL (ref 8.5–10.1)
CHLORIDE SERPL-SCNC: 104 MMOL/L (ref 97–108)
CHOLEST SERPL-MCNC: 201 MG/DL
CO2 SERPL-SCNC: 29 MMOL/L (ref 21–32)
COLOR UR: ABNORMAL
CREAT SERPL-MCNC: 0.88 MG/DL (ref 0.55–1.02)
CREAT UR-MCNC: 107 MG/DL
DIFFERENTIAL METHOD BLD: NORMAL
EOSINOPHIL # BLD: 0.2 K/UL (ref 0–0.4)
EOSINOPHIL NFR BLD: 4 % (ref 0–7)
EPITH CASTS URNS QL MICRO: ABNORMAL /LPF
ERYTHROCYTE [DISTWIDTH] IN BLOOD BY AUTOMATED COUNT: 13.7 % (ref 11.5–14.5)
EST. AVERAGE GLUCOSE BLD GHB EST-MCNC: 137 MG/DL
GLOBULIN SER CALC-MCNC: 3.7 G/DL (ref 2–4)
GLUCOSE SERPL-MCNC: 219 MG/DL (ref 65–100)
GLUCOSE UR STRIP.AUTO-MCNC: 500 MG/DL
HBA1C MFR BLD: 6.4 % (ref 4–5.6)
HCT VFR BLD AUTO: 42.7 % (ref 35–47)
HDLC SERPL-MCNC: 59 MG/DL
HDLC SERPL: 3.4 {RATIO} (ref 0–5)
HGB BLD-MCNC: 13.3 G/DL (ref 11.5–16)
HGB UR QL STRIP: NEGATIVE
HYALINE CASTS URNS QL MICRO: ABNORMAL /LPF (ref 0–5)
IMM GRANULOCYTES # BLD AUTO: 0 K/UL (ref 0–0.04)
IMM GRANULOCYTES NFR BLD AUTO: 0 % (ref 0–0.5)
KETONES UR QL STRIP.AUTO: NEGATIVE MG/DL
LDLC SERPL CALC-MCNC: 115.8 MG/DL (ref 0–100)
LEUKOCYTE ESTERASE UR QL STRIP.AUTO: ABNORMAL
LYMPHOCYTES # BLD: 2.5 K/UL (ref 0.8–3.5)
LYMPHOCYTES NFR BLD: 45 % (ref 12–49)
MCH RBC QN AUTO: 30.2 PG (ref 26–34)
MCHC RBC AUTO-ENTMCNC: 31.1 G/DL (ref 30–36.5)
MCV RBC AUTO: 97 FL (ref 80–99)
MICROALBUMIN UR-MCNC: 0.83 MG/DL
MICROALBUMIN/CREAT UR-RTO: 8 MG/G (ref 0–30)
MONOCYTES # BLD: 0.5 K/UL (ref 0–1)
MONOCYTES NFR BLD: 9 % (ref 5–13)
NEUTS SEG # BLD: 2.2 K/UL (ref 1.8–8)
NEUTS SEG NFR BLD: 41 % (ref 32–75)
NITRITE UR QL STRIP.AUTO: NEGATIVE
NRBC # BLD: 0 K/UL (ref 0–0.01)
NRBC BLD-RTO: 0 PER 100 WBC
PH UR STRIP: 6 [PH] (ref 5–8)
PLATELET # BLD AUTO: 291 K/UL (ref 150–400)
PMV BLD AUTO: 10 FL (ref 8.9–12.9)
POTASSIUM SERPL-SCNC: 3.9 MMOL/L (ref 3.5–5.1)
PROT SERPL-MCNC: 7.9 G/DL (ref 6.4–8.2)
PROT UR STRIP-MCNC: NEGATIVE MG/DL
RBC # BLD AUTO: 4.4 M/UL (ref 3.8–5.2)
RBC #/AREA URNS HPF: ABNORMAL /HPF (ref 0–5)
SODIUM SERPL-SCNC: 138 MMOL/L (ref 136–145)
SP GR UR REFRACTOMETRY: 1.02 (ref 1–1.03)
TRIGL SERPL-MCNC: 131 MG/DL (ref ?–150)
TSH SERPL DL<=0.05 MIU/L-ACNC: 2.04 UIU/ML (ref 0.36–3.74)
UA: UC IF INDICATED,UAUC: ABNORMAL
UROBILINOGEN UR QL STRIP.AUTO: 0.2 EU/DL (ref 0.2–1)
VLDLC SERPL CALC-MCNC: 26.2 MG/DL
WBC # BLD AUTO: 5.5 K/UL (ref 3.6–11)
WBC URNS QL MICRO: ABNORMAL /HPF (ref 0–4)

## 2022-12-03 RX ORDER — ROSUVASTATIN CALCIUM 20 MG/1
20 TABLET, COATED ORAL
Qty: 90 TABLET | Refills: 3 | Status: SHIPPED | OUTPATIENT
Start: 2022-12-03

## 2022-12-04 LAB
BACTERIA SPEC CULT: NORMAL
SERVICE CMNT-IMP: NORMAL

## 2022-12-04 NOTE — PROGRESS NOTES
Labs are normal with exception of the Following caveats:  Vitamin D level is above normal I would suggest that she stop any vitamin D supplements  Blood sugar control is excellent  Your cholesterol is not at goal and therefore I would like to stop the pravastatin and placed you on rosuvastatin, a prescription has been sent to the pharmacy

## 2022-12-13 ENCOUNTER — HOSPITAL ENCOUNTER (OUTPATIENT)
Dept: MAMMOGRAPHY | Age: 72
Discharge: HOME | End: 2022-12-13
Attending: INTERNAL MEDICINE
Payer: MEDICARE

## 2022-12-13 DIAGNOSIS — Z12.31 VISIT FOR SCREENING MAMMOGRAM: ICD-10-CM

## 2022-12-13 PROCEDURE — 77067 SCR MAMMO BI INCL CAD: CPT

## 2023-01-23 ENCOUNTER — HOSPITAL ENCOUNTER (OUTPATIENT)
Dept: MAMMOGRAPHY | Age: 73
Discharge: HOME | End: 2023-01-23
Attending: INTERNAL MEDICINE
Payer: MEDICARE

## 2023-01-23 DIAGNOSIS — R92.8 ABNORMAL MAMMOGRAM: ICD-10-CM

## 2023-01-23 PROCEDURE — 77061 BREAST TOMOSYNTHESIS UNI: CPT

## 2023-02-20 ENCOUNTER — HOSPITAL ENCOUNTER (OUTPATIENT)
Dept: MAMMOGRAPHY | Age: 73
Discharge: HOME | End: 2023-02-20
Attending: INTERNAL MEDICINE
Payer: MEDICARE

## 2023-02-20 DIAGNOSIS — Z78.0 POST-MENOPAUSAL: ICD-10-CM

## 2023-02-20 DIAGNOSIS — Z13.820 SCREENING FOR OSTEOPOROSIS: ICD-10-CM

## 2023-02-20 PROCEDURE — 77080 DXA BONE DENSITY AXIAL: CPT

## 2023-03-08 ENCOUNTER — OFFICE VISIT (OUTPATIENT)
Dept: INTERNAL MEDICINE CLINIC | Age: 73
End: 2023-03-08
Payer: MEDICARE

## 2023-03-08 VITALS
TEMPERATURE: 97.9 F | WEIGHT: 138.4 LBS | HEIGHT: 64 IN | SYSTOLIC BLOOD PRESSURE: 131 MMHG | HEART RATE: 90 BPM | BODY MASS INDEX: 23.63 KG/M2 | RESPIRATION RATE: 18 BRPM | DIASTOLIC BLOOD PRESSURE: 76 MMHG | OXYGEN SATURATION: 98 %

## 2023-03-08 DIAGNOSIS — M75.52 SUBACROMIAL BURSITIS OF LEFT SHOULDER JOINT: ICD-10-CM

## 2023-03-08 DIAGNOSIS — H91.93 BILATERAL HEARING LOSS, UNSPECIFIED HEARING LOSS TYPE: ICD-10-CM

## 2023-03-08 DIAGNOSIS — E11.9 TYPE 2 DIABETES MELLITUS WITHOUT COMPLICATION, WITHOUT LONG-TERM CURRENT USE OF INSULIN (HCC): ICD-10-CM

## 2023-03-08 DIAGNOSIS — K76.0 FATTY LIVER: ICD-10-CM

## 2023-03-08 DIAGNOSIS — I51.89 DIASTOLIC DYSFUNCTION: ICD-10-CM

## 2023-03-08 DIAGNOSIS — R06.02 SOB (SHORTNESS OF BREATH): ICD-10-CM

## 2023-03-08 DIAGNOSIS — E78.5 DYSLIPIDEMIA: ICD-10-CM

## 2023-03-08 DIAGNOSIS — I10 PRIMARY HYPERTENSION: ICD-10-CM

## 2023-03-08 DIAGNOSIS — Z00.00 MEDICARE ANNUAL WELLNESS VISIT, SUBSEQUENT: Primary | ICD-10-CM

## 2023-03-08 DIAGNOSIS — Z13.39 SCREENING FOR ALCOHOLISM: ICD-10-CM

## 2023-03-08 PROBLEM — M75.50 BURSITIS, SUBACROMIAL: Status: ACTIVE | Noted: 2023-03-08

## 2023-03-08 PROCEDURE — G8427 DOCREV CUR MEDS BY ELIG CLIN: HCPCS | Performed by: INTERNAL MEDICINE

## 2023-03-08 PROCEDURE — 3075F SYST BP GE 130 - 139MM HG: CPT | Performed by: INTERNAL MEDICINE

## 2023-03-08 PROCEDURE — G8510 SCR DEP NEG, NO PLAN REQD: HCPCS | Performed by: INTERNAL MEDICINE

## 2023-03-08 PROCEDURE — 3017F COLORECTAL CA SCREEN DOC REV: CPT | Performed by: INTERNAL MEDICINE

## 2023-03-08 PROCEDURE — 1090F PRES/ABSN URINE INCON ASSESS: CPT | Performed by: INTERNAL MEDICINE

## 2023-03-08 PROCEDURE — G8536 NO DOC ELDER MAL SCRN: HCPCS | Performed by: INTERNAL MEDICINE

## 2023-03-08 PROCEDURE — G9899 SCRN MAM PERF RSLTS DOC: HCPCS | Performed by: INTERNAL MEDICINE

## 2023-03-08 PROCEDURE — G8420 CALC BMI NORM PARAMETERS: HCPCS | Performed by: INTERNAL MEDICINE

## 2023-03-08 PROCEDURE — G0439 PPPS, SUBSEQ VISIT: HCPCS | Performed by: INTERNAL MEDICINE

## 2023-03-08 PROCEDURE — 1101F PT FALLS ASSESS-DOCD LE1/YR: CPT | Performed by: INTERNAL MEDICINE

## 2023-03-08 PROCEDURE — 3046F HEMOGLOBIN A1C LEVEL >9.0%: CPT | Performed by: INTERNAL MEDICINE

## 2023-03-08 PROCEDURE — 2022F DILAT RTA XM EVC RTNOPTHY: CPT | Performed by: INTERNAL MEDICINE

## 2023-03-08 PROCEDURE — G8399 PT W/DXA RESULTS DOCUMENT: HCPCS | Performed by: INTERNAL MEDICINE

## 2023-03-08 PROCEDURE — 99213 OFFICE O/P EST LOW 20 MIN: CPT | Performed by: INTERNAL MEDICINE

## 2023-03-08 PROCEDURE — 3078F DIAST BP <80 MM HG: CPT | Performed by: INTERNAL MEDICINE

## 2023-03-08 NOTE — PATIENT INSTRUCTIONS
Medicare Wellness Visit, Female     The best way to live healthy is to have a lifestyle where you eat a well-balanced diet, exercise regularly, limit alcohol use, and quit all forms of tobacco/nicotine, if applicable. Regular preventive services are another way to keep healthy. Preventive services (vaccines, screening tests, monitoring & exams) can help personalize your care plan, which helps you manage your own care. Screening tests can find health problems at the earliest stages, when they are easiest to treat. [facility] follows the current, evidence-based guidelines published by the [facility] ([facility]) when recommending preventive services for our patients. Because we follow these guidelines, sometimes recommendations change over time as research supports it. (For example, mammograms used to be recommended annually. Even though Medicare will still pay for an annual mammogram, the newer guidelines recommend a mammogram every two years for women of average risk). Of course, you and your doctor may decide to screen more often for some diseases, based on your risk and your co-morbidities (chronic disease you are already diagnosed with). Preventive services for you include:  - Medicare offers their members a free annual wellness visit, which is time for you and your primary care provider to discuss and plan for your preventive service needs.  Take advantage of this benefit every year!    -Over the age of 72 should receive the recommended pneumonia vaccines.    -All adults should have a flu vaccine yearly.  -All adults should have a tetanus vaccine every 10 years.   -Over the age 48 should receive the shingles vaccines.        -All adults should be screened once for Hepatitis C.  -All adults age 38-68 who are overweight should have a diabetes screening test once every three years.   -Other screening tests and preventive services for persons with diabetes include: an eye exam to screen for diabetic retinopathy, a kidney function test, a foot exam, and stricter control over your cholesterol.   -Cardiovascular screening for adults with routine risk involves an electrocardiogram (ECG) at intervals determined by your doctor.     -Colorectal cancer screenings should be done for adults age 39-70 with no increased risk factors for colorectal cancer. There are a number of acceptable methods of screening for this type of cancer. Each test has its own benefits and drawbacks. Discuss with your doctor what is most appropriate for you during your annual wellness visit. The different tests include: colonoscopy (considered the best screening method), a fecal occult blood test, a fecal DNA test, and sigmoidoscopy.    -Lung cancer screening is recommended annually with a low dose CT scan for adults between age 54 and 68, who have smoked at least 30 pack years (equivalent of 1 pack per day for 30 days), and who is a current smoker or quit less than 15 years ago.    -A bone mass density test is recommended when a woman turns 65 to screen for osteoporosis. This test is only recommended one time, as a screening. Some providers will use this same test as a disease monitoring tool if you already have osteoporosis. -Breast cancer screenings are recommended every other year for women of normal risk, age 54-69.    -Cervical cancer screenings for women over age 72 are only recommended with certain risk factors.      Here is a list of your current Health Maintenance items (your personalized list of preventive services) with a due date:  Health Maintenance Due   Topic Date Due    COVID-19 Vaccine (1) Never done    Pneumococcal Vaccine (1 - PCV) Never done    Shingles Vaccine (1 of 2) Never done    Diabetic Foot Care  03/07/2019    Eye Exam  02/24/2022    Yearly Flu Vaccine (1) 08/01/2022
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
## 2023-03-08 NOTE — PROGRESS NOTES
Chief Complaint   Patient presents with    Cholesterol Problem    Diabetes    Hypertension     YES Answers must have Comments  1. \"Have you been to the ER, urgent care clinic since your last visit? Hospitalized since your last visit? \"    [] YES   [x] NO       2. Have you seen or consulted any other health care providers outside of [address] [place] [address] since your last visit?     [] YES   [x] NO       3. For patients aged 39-70: Have you had a colorectal cancer screening such as a colonoscopy/FIT/Cologuard? Nurse/CMA to request records if not in chart   [] YES   [x] NO   [] NA, based on age    If the patient is female:      4. For female patients aged 41-77: Vicci Eaton you had a mammogram in the last two years?  Nurse/CMA to request records if not in chart   [x] YES When December and Where [facility]  [] NO   [] NA, based on age    11. For female patients aged 21-65: Vicci Eaton you had a pap smear?   Nurse/CMA to request records if not in chart   [] YES   [] NO  [x] NA, based on age
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
## 2023-03-09 LAB
ALBUMIN SERPL-MCNC: 4.4 G/DL (ref 3.5–5)
ANION GAP SERPL CALC-SCNC: 4 MMOL/L (ref 5–15)
BNP SERPL-MCNC: 12 PG/ML
BUN SERPL-MCNC: 20 MG/DL (ref 6–20)
BUN/CREAT SERPL: 20 (ref 12–20)
CALCIUM SERPL-MCNC: 10.3 MG/DL (ref 8.5–10.1)
CHLORIDE SERPL-SCNC: 105 MMOL/L (ref 97–108)
CHOLEST SERPL-MCNC: 129 MG/DL
CO2 SERPL-SCNC: 31 MMOL/L (ref 21–32)
CREAT SERPL-MCNC: 0.99 MG/DL (ref 0.55–1.02)
EST. AVERAGE GLUCOSE BLD GHB EST-MCNC: 146 MG/DL
GLUCOSE SERPL-MCNC: 165 MG/DL (ref 65–100)
HBA1C MFR BLD: 6.7 % (ref 4–5.6)
HDLC SERPL-MCNC: 50 MG/DL
HDLC SERPL: 2.6 (ref 0–5)
LDLC SERPL CALC-MCNC: 60.8 MG/DL (ref 0–100)
PHOSPHATE SERPL-MCNC: 2.1 MG/DL (ref 2.6–4.7)
POTASSIUM SERPL-SCNC: 3.7 MMOL/L (ref 3.5–5.1)
SODIUM SERPL-SCNC: 140 MMOL/L (ref 136–145)
TRIGL SERPL-MCNC: 91 MG/DL (ref ?–150)
VLDLC SERPL CALC-MCNC: 18.2 MG/DL

## 2023-03-11 NOTE — PROGRESS NOTES
You are doing a good job. Your labs are stable. Your blood sugar control is good. Your cholesterol is good.

## 2023-03-12 ENCOUNTER — PATIENT MESSAGE (OUTPATIENT)
Dept: INTERNAL MEDICINE CLINIC | Age: 73
End: 2023-03-12

## 2023-04-01 RX ORDER — AZELASTINE HCL 205.5 UG/1
2 SPRAY NASAL 2 TIMES DAILY
Qty: 30 ML | Refills: 11 | Status: SHIPPED | OUTPATIENT
Start: 2023-04-01

## 2023-04-01 RX ORDER — FLUTICASONE PROPIONATE 50 MCG
2 SPRAY, SUSPENSION (ML) NASAL DAILY
Qty: 1 EACH | Refills: 11 | Status: SHIPPED | OUTPATIENT
Start: 2023-04-01

## 2023-04-17 ENCOUNTER — HOSPITAL ENCOUNTER (OUTPATIENT)
Dept: NON INVASIVE DIAGNOSTICS | Age: 73
Discharge: HOME | End: 2023-04-17
Attending: INTERNAL MEDICINE
Payer: MEDICARE

## 2023-04-17 ENCOUNTER — HOSPITAL ENCOUNTER (OUTPATIENT)
Dept: NUCLEAR MEDICINE | Age: 73
Discharge: HOME | End: 2023-04-17
Attending: INTERNAL MEDICINE
Payer: MEDICARE

## 2023-04-17 VITALS — WEIGHT: 138 LBS | BODY MASS INDEX: 23.69 KG/M2

## 2023-04-17 DIAGNOSIS — R06.02 SOB (SHORTNESS OF BREATH): ICD-10-CM

## 2023-04-17 DIAGNOSIS — E11.9 TYPE 2 DIABETES MELLITUS WITHOUT COMPLICATION, WITHOUT LONG-TERM CURRENT USE OF INSULIN (HCC): ICD-10-CM

## 2023-04-17 PROCEDURE — 78452 HT MUSCLE IMAGE SPECT MULT: CPT

## 2023-04-17 PROCEDURE — 93017 CV STRESS TEST TRACING ONLY: CPT

## 2023-04-17 PROCEDURE — 74011000250 HC RX REV CODE- 250: Performed by: INTERNAL MEDICINE

## 2023-04-17 PROCEDURE — 74011250636 HC RX REV CODE- 250/636: Performed by: INTERNAL MEDICINE

## 2023-04-17 RX ORDER — SODIUM CHLORIDE 0.9 % (FLUSH) 0.9 %
10 SYRINGE (ML) INJECTION AS NEEDED
Status: DISCONTINUED | OUTPATIENT
Start: 2023-04-17 | End: 2023-04-21 | Stop reason: HOSPADM

## 2023-04-17 RX ORDER — REGADENOSON 0.08 MG/ML
0.4 INJECTION, SOLUTION INTRAVENOUS ONCE
Status: COMPLETED | OUTPATIENT
Start: 2023-04-17 | End: 2023-04-17

## 2023-04-17 RX ORDER — TETRAKIS(2-METHOXYISOBUTYLISOCYANIDE)COPPER(I) TETRAFLUOROBORATE 1 MG/ML
31.8 INJECTION, POWDER, LYOPHILIZED, FOR SOLUTION INTRAVENOUS
Status: COMPLETED | OUTPATIENT
Start: 2023-04-17 | End: 2023-04-17

## 2023-04-17 RX ORDER — TETRAKIS(2-METHOXYISOBUTYLISOCYANIDE)COPPER(I) TETRAFLUOROBORATE 1 MG/ML
10.8 INJECTION, POWDER, LYOPHILIZED, FOR SOLUTION INTRAVENOUS
Status: COMPLETED | OUTPATIENT
Start: 2023-04-17 | End: 2023-04-17

## 2023-04-17 RX ADMIN — SODIUM CHLORIDE, PRESERVATIVE FREE 20 ML: 5 INJECTION INTRAVENOUS at 09:19

## 2023-04-17 RX ADMIN — TETRAKIS(2-METHOXYISOBUTYLISOCYANIDE)COPPER(I) TETRAFLUOROBORATE 31.8 MILLICURIE: 1 INJECTION, POWDER, LYOPHILIZED, FOR SOLUTION INTRAVENOUS at 09:20

## 2023-04-17 RX ADMIN — REGADENOSON 0.4 MG: 0.08 INJECTION, SOLUTION INTRAVENOUS at 09:19

## 2023-04-17 RX ADMIN — TETRAKIS(2-METHOXYISOBUTYLISOCYANIDE)COPPER(I) TETRAFLUOROBORATE 10.8 MILLICURIE: 1 INJECTION, POWDER, LYOPHILIZED, FOR SOLUTION INTRAVENOUS at 07:44

## 2023-04-18 PROBLEM — Z92.89 HISTORY OF NUCLEAR STRESS TEST: Status: ACTIVE | Noted: 2023-04-18

## 2023-04-18 LAB
STRESS BASELINE DIAS BP: 85 MMHG
STRESS BASELINE HR: 67 BPM
STRESS BASELINE SYS BP: 143 MMHG
STRESS ESTIMATED WORKLOAD: 6.8 METS
STRESS EXERCISE DUR MIN: 4 MIN
STRESS EXERCISE DUR SEC: 54 SEC
STRESS PEAK DIAS BP: 80 MMHG
STRESS PEAK SYS BP: 128 MMHG
STRESS PERCENT HR ACHIEVED: 62 %
STRESS POST PEAK HR: 93 BPM
STRESS RATE PRESSURE PRODUCT: NORMAL BPM*MMHG
STRESS STAGE 1 BP: NORMAL MMHG
STRESS STAGE 1 HR: 91 BPM
STRESS STAGE 2 HR: 93 BPM
STRESS STAGE RECOVERY 1 BP: NORMAL MMHG
STRESS STAGE RECOVERY 1 HR: 88 BPM
STRESS STAGE RECOVERY 2 BP: NORMAL MMHG
STRESS STAGE RECOVERY 2 HR: 85 BPM
STRESS TARGET HR: 150 BPM

## 2023-05-31 PROBLEM — M67.912 TENDINOPATHY OF LEFT ROTATOR CUFF: Status: ACTIVE | Noted: 2023-05-13

## 2023-07-25 ENCOUNTER — OFFICE VISIT (OUTPATIENT)
Facility: CLINIC | Age: 73
End: 2023-07-25
Payer: MEDICARE

## 2023-07-25 VITALS
HEIGHT: 64 IN | RESPIRATION RATE: 18 BRPM | HEART RATE: 102 BPM | WEIGHT: 131.2 LBS | DIASTOLIC BLOOD PRESSURE: 67 MMHG | TEMPERATURE: 98 F | SYSTOLIC BLOOD PRESSURE: 111 MMHG | BODY MASS INDEX: 22.4 KG/M2 | OXYGEN SATURATION: 97 %

## 2023-07-25 DIAGNOSIS — I10 PRIMARY HYPERTENSION: ICD-10-CM

## 2023-07-25 DIAGNOSIS — E11.9 TYPE 2 DIABETES MELLITUS WITHOUT COMPLICATION, WITHOUT LONG-TERM CURRENT USE OF INSULIN (HCC): ICD-10-CM

## 2023-07-25 DIAGNOSIS — M67.912 TENDINOPATHY OF LEFT ROTATOR CUFF: ICD-10-CM

## 2023-07-25 DIAGNOSIS — Z01.818 PREOP EXAMINATION: Primary | ICD-10-CM

## 2023-07-25 DIAGNOSIS — K76.0 FATTY LIVER: ICD-10-CM

## 2023-07-25 DIAGNOSIS — I51.89 DIASTOLIC DYSFUNCTION: ICD-10-CM

## 2023-07-25 DIAGNOSIS — E78.5 DYSLIPIDEMIA: ICD-10-CM

## 2023-07-25 PROCEDURE — 3017F COLORECTAL CA SCREEN DOC REV: CPT | Performed by: INTERNAL MEDICINE

## 2023-07-25 PROCEDURE — 99214 OFFICE O/P EST MOD 30 MIN: CPT | Performed by: INTERNAL MEDICINE

## 2023-07-25 PROCEDURE — 1090F PRES/ABSN URINE INCON ASSESS: CPT | Performed by: INTERNAL MEDICINE

## 2023-07-25 PROCEDURE — 1036F TOBACCO NON-USER: CPT | Performed by: INTERNAL MEDICINE

## 2023-07-25 PROCEDURE — G8420 CALC BMI NORM PARAMETERS: HCPCS | Performed by: INTERNAL MEDICINE

## 2023-07-25 PROCEDURE — 2022F DILAT RTA XM EVC RTNOPTHY: CPT | Performed by: INTERNAL MEDICINE

## 2023-07-25 PROCEDURE — 3078F DIAST BP <80 MM HG: CPT | Performed by: INTERNAL MEDICINE

## 2023-07-25 PROCEDURE — G8427 DOCREV CUR MEDS BY ELIG CLIN: HCPCS | Performed by: INTERNAL MEDICINE

## 2023-07-25 PROCEDURE — 3044F HG A1C LEVEL LT 7.0%: CPT | Performed by: INTERNAL MEDICINE

## 2023-07-25 PROCEDURE — 3074F SYST BP LT 130 MM HG: CPT | Performed by: INTERNAL MEDICINE

## 2023-07-25 PROCEDURE — G8399 PT W/DXA RESULTS DOCUMENT: HCPCS | Performed by: INTERNAL MEDICINE

## 2023-07-25 PROCEDURE — 1123F ACP DISCUSS/DSCN MKR DOCD: CPT | Performed by: INTERNAL MEDICINE

## 2023-07-25 RX ORDER — PREDNISONE 20 MG/1
40 TABLET ORAL DAILY
Qty: 20 TABLET | Refills: 0 | Status: SHIPPED | OUTPATIENT
Start: 2023-07-25 | End: 2023-08-04

## 2023-07-25 ASSESSMENT — PATIENT HEALTH QUESTIONNAIRE - PHQ9
SUM OF ALL RESPONSES TO PHQ9 QUESTIONS 1 & 2: 0
1. LITTLE INTEREST OR PLEASURE IN DOING THINGS: 0
SUM OF ALL RESPONSES TO PHQ QUESTIONS 1-9: 0
2. FEELING DOWN, DEPRESSED OR HOPELESS: 0
SUM OF ALL RESPONSES TO PHQ QUESTIONS 1-9: 0

## 2023-07-26 LAB
AMORPH CRY URNS QL MICRO: ABNORMAL
ANION GAP SERPL CALC-SCNC: 7 MMOL/L (ref 5–15)
APPEARANCE UR: ABNORMAL
BACTERIA URNS QL MICRO: ABNORMAL /HPF
BASOPHILS # BLD: 0.1 K/UL (ref 0–0.1)
BASOPHILS NFR BLD: 1 % (ref 0–1)
BILIRUB UR QL: NEGATIVE
BUN SERPL-MCNC: 32 MG/DL (ref 6–20)
BUN/CREAT SERPL: 22 (ref 12–20)
CALCIUM SERPL-MCNC: 10.6 MG/DL (ref 8.5–10.1)
CAOX CRY URNS QL MICRO: ABNORMAL
CHLORIDE SERPL-SCNC: 101 MMOL/L (ref 97–108)
CO2 SERPL-SCNC: 31 MMOL/L (ref 21–32)
COLOR UR: ABNORMAL
CREAT SERPL-MCNC: 1.45 MG/DL (ref 0.55–1.02)
DIFFERENTIAL METHOD BLD: ABNORMAL
EOSINOPHIL # BLD: 0.2 K/UL (ref 0–0.4)
EOSINOPHIL NFR BLD: 3 % (ref 0–7)
EPITH CASTS URNS QL MICRO: ABNORMAL /LPF
ERYTHROCYTE [DISTWIDTH] IN BLOOD BY AUTOMATED COUNT: 14.6 % (ref 11.5–14.5)
EST. AVERAGE GLUCOSE BLD GHB EST-MCNC: 154 MG/DL
GLUCOSE SERPL-MCNC: 140 MG/DL (ref 65–100)
GLUCOSE UR STRIP.AUTO-MCNC: NEGATIVE MG/DL
GRAN CASTS URNS QL MICRO: ABNORMAL /LPF
HBA1C MFR BLD: 7 % (ref 4–5.6)
HCT VFR BLD AUTO: 44.4 % (ref 35–47)
HCV AB SERPL QL IA: NONREACTIVE
HGB BLD-MCNC: 13.4 G/DL (ref 11.5–16)
HGB UR QL STRIP: ABNORMAL
IMM GRANULOCYTES # BLD AUTO: 0 K/UL (ref 0–0.04)
IMM GRANULOCYTES NFR BLD AUTO: 0 % (ref 0–0.5)
KETONES UR QL STRIP.AUTO: NEGATIVE MG/DL
LEUKOCYTE ESTERASE UR QL STRIP.AUTO: ABNORMAL
LYMPHOCYTES # BLD: 3 K/UL (ref 0.8–3.5)
LYMPHOCYTES NFR BLD: 43 % (ref 12–49)
MCH RBC QN AUTO: 27.9 PG (ref 26–34)
MCHC RBC AUTO-ENTMCNC: 30.2 G/DL (ref 30–36.5)
MCV RBC AUTO: 92.5 FL (ref 80–99)
MONOCYTES # BLD: 0.8 K/UL (ref 0–1)
MONOCYTES NFR BLD: 11 % (ref 5–13)
MUCOUS THREADS URNS QL MICRO: ABNORMAL /LPF
NEUTS SEG # BLD: 2.9 K/UL (ref 1.8–8)
NEUTS SEG NFR BLD: 42 % (ref 32–75)
NITRITE UR QL STRIP.AUTO: NEGATIVE
NRBC # BLD: 0 K/UL (ref 0–0.01)
NRBC BLD-RTO: 0 PER 100 WBC
PH UR STRIP: 5.5 (ref 5–8)
PLATELET # BLD AUTO: 314 K/UL (ref 150–400)
PMV BLD AUTO: 9.8 FL (ref 8.9–12.9)
POTASSIUM SERPL-SCNC: 3.6 MMOL/L (ref 3.5–5.1)
PROT UR STRIP-MCNC: 100 MG/DL
RBC # BLD AUTO: 4.8 M/UL (ref 3.8–5.2)
RBC #/AREA URNS HPF: ABNORMAL /HPF (ref 0–5)
SODIUM SERPL-SCNC: 139 MMOL/L (ref 136–145)
SP GR UR REFRACTOMETRY: 1.02 (ref 1–1.03)
URINE CULTURE IF INDICATED: ABNORMAL
UROBILINOGEN UR QL STRIP.AUTO: 1 EU/DL (ref 0.2–1)
WBC # BLD AUTO: 6.9 K/UL (ref 3.6–11)
WBC URNS QL MICRO: ABNORMAL /HPF (ref 0–4)

## 2023-09-06 RX ORDER — METFORMIN HYDROCHLORIDE 500 MG/1
TABLET, EXTENDED RELEASE ORAL
Qty: 30 TABLET | Refills: 11 | Status: SHIPPED | OUTPATIENT
Start: 2023-09-06 | End: 2023-09-08 | Stop reason: SDUPTHER

## 2023-09-08 RX ORDER — METFORMIN HYDROCHLORIDE 500 MG/1
TABLET, EXTENDED RELEASE ORAL
Qty: 90 TABLET | Refills: 3 | Status: SHIPPED | OUTPATIENT
Start: 2023-09-08

## 2023-09-20 RX ORDER — ROSUVASTATIN CALCIUM 20 MG/1
20 TABLET, COATED ORAL
Qty: 90 TABLET | Refills: 3 | Status: SHIPPED | OUTPATIENT
Start: 2023-09-20

## 2023-09-20 RX ORDER — VALSARTAN AND HYDROCHLOROTHIAZIDE 320; 12.5 MG/1; MG/1
1 TABLET, FILM COATED ORAL DAILY
Qty: 90 TABLET | Refills: 3 | Status: SHIPPED | OUTPATIENT
Start: 2023-09-20

## 2023-11-21 SDOH — ECONOMIC STABILITY: INCOME INSECURITY: HOW HARD IS IT FOR YOU TO PAY FOR THE VERY BASICS LIKE FOOD, HOUSING, MEDICAL CARE, AND HEATING?: NOT HARD AT ALL

## 2023-11-21 SDOH — ECONOMIC STABILITY: TRANSPORTATION INSECURITY
IN THE PAST 12 MONTHS, HAS LACK OF TRANSPORTATION KEPT YOU FROM MEETINGS, WORK, OR FROM GETTING THINGS NEEDED FOR DAILY LIVING?: NO

## 2023-11-21 SDOH — ECONOMIC STABILITY: HOUSING INSECURITY
IN THE LAST 12 MONTHS, WAS THERE A TIME WHEN YOU DID NOT HAVE A STEADY PLACE TO SLEEP OR SLEPT IN A SHELTER (INCLUDING NOW)?: NO

## 2023-11-21 SDOH — ECONOMIC STABILITY: FOOD INSECURITY: WITHIN THE PAST 12 MONTHS, THE FOOD YOU BOUGHT JUST DIDN'T LAST AND YOU DIDN'T HAVE MONEY TO GET MORE.: NEVER TRUE

## 2023-11-21 SDOH — ECONOMIC STABILITY: FOOD INSECURITY: WITHIN THE PAST 12 MONTHS, YOU WORRIED THAT YOUR FOOD WOULD RUN OUT BEFORE YOU GOT MONEY TO BUY MORE.: NEVER TRUE

## 2023-11-22 ENCOUNTER — OFFICE VISIT (OUTPATIENT)
Facility: CLINIC | Age: 73
End: 2023-11-22
Payer: MEDICARE

## 2023-11-22 VITALS
DIASTOLIC BLOOD PRESSURE: 76 MMHG | OXYGEN SATURATION: 96 % | WEIGHT: 135 LBS | TEMPERATURE: 98.2 F | BODY MASS INDEX: 23.05 KG/M2 | HEIGHT: 64 IN | RESPIRATION RATE: 16 BRPM | SYSTOLIC BLOOD PRESSURE: 125 MMHG | HEART RATE: 77 BPM

## 2023-11-22 DIAGNOSIS — Z11.59 ENCOUNTER FOR SCREENING FOR OTHER VIRAL DISEASES: ICD-10-CM

## 2023-11-22 DIAGNOSIS — E78.5 DYSLIPIDEMIA: ICD-10-CM

## 2023-11-22 DIAGNOSIS — K80.20 GALL BLADDER STONES: ICD-10-CM

## 2023-11-22 DIAGNOSIS — K76.0 FATTY LIVER: ICD-10-CM

## 2023-11-22 DIAGNOSIS — E11.9 TYPE 2 DIABETES MELLITUS WITHOUT COMPLICATION, WITHOUT LONG-TERM CURRENT USE OF INSULIN (HCC): Primary | ICD-10-CM

## 2023-11-22 DIAGNOSIS — Z12.11 SCREEN FOR COLON CANCER: ICD-10-CM

## 2023-11-22 DIAGNOSIS — I51.89 DIASTOLIC DYSFUNCTION: ICD-10-CM

## 2023-11-22 DIAGNOSIS — I10 PRIMARY HYPERTENSION: ICD-10-CM

## 2023-11-22 PROBLEM — R06.02 SOB (SHORTNESS OF BREATH): Status: RESOLVED | Noted: 2023-03-08 | Resolved: 2023-11-22

## 2023-11-22 PROBLEM — M75.50 BURSITIS, SUBACROMIAL: Status: RESOLVED | Noted: 2023-03-08 | Resolved: 2023-11-22

## 2023-11-22 PROBLEM — M79.602 PAIN IN INFERIOR LEFT UPPER EXTREMITY: Status: RESOLVED | Noted: 2020-10-01 | Resolved: 2023-11-22

## 2023-11-22 PROCEDURE — 1036F TOBACCO NON-USER: CPT | Performed by: INTERNAL MEDICINE

## 2023-11-22 PROCEDURE — G8484 FLU IMMUNIZE NO ADMIN: HCPCS | Performed by: INTERNAL MEDICINE

## 2023-11-22 PROCEDURE — 3017F COLORECTAL CA SCREEN DOC REV: CPT | Performed by: INTERNAL MEDICINE

## 2023-11-22 PROCEDURE — 1123F ACP DISCUSS/DSCN MKR DOCD: CPT | Performed by: INTERNAL MEDICINE

## 2023-11-22 PROCEDURE — 99214 OFFICE O/P EST MOD 30 MIN: CPT | Performed by: INTERNAL MEDICINE

## 2023-11-22 PROCEDURE — G8399 PT W/DXA RESULTS DOCUMENT: HCPCS | Performed by: INTERNAL MEDICINE

## 2023-11-22 PROCEDURE — 2022F DILAT RTA XM EVC RTNOPTHY: CPT | Performed by: INTERNAL MEDICINE

## 2023-11-22 PROCEDURE — G8427 DOCREV CUR MEDS BY ELIG CLIN: HCPCS | Performed by: INTERNAL MEDICINE

## 2023-11-22 PROCEDURE — 3074F SYST BP LT 130 MM HG: CPT | Performed by: INTERNAL MEDICINE

## 2023-11-22 PROCEDURE — 3051F HG A1C>EQUAL 7.0%<8.0%: CPT | Performed by: INTERNAL MEDICINE

## 2023-11-22 PROCEDURE — G8420 CALC BMI NORM PARAMETERS: HCPCS | Performed by: INTERNAL MEDICINE

## 2023-11-22 PROCEDURE — 3078F DIAST BP <80 MM HG: CPT | Performed by: INTERNAL MEDICINE

## 2023-11-22 PROCEDURE — 1090F PRES/ABSN URINE INCON ASSESS: CPT | Performed by: INTERNAL MEDICINE

## 2023-11-22 ASSESSMENT — PATIENT HEALTH QUESTIONNAIRE - PHQ9
1. LITTLE INTEREST OR PLEASURE IN DOING THINGS: 0
SUM OF ALL RESPONSES TO PHQ QUESTIONS 1-9: 0
SUM OF ALL RESPONSES TO PHQ QUESTIONS 1-9: 0
SUM OF ALL RESPONSES TO PHQ9 QUESTIONS 1 & 2: 0
SUM OF ALL RESPONSES TO PHQ QUESTIONS 1-9: 0
SUM OF ALL RESPONSES TO PHQ QUESTIONS 1-9: 0
2. FEELING DOWN, DEPRESSED OR HOPELESS: 0

## 2023-11-23 LAB
ANION GAP SERPL CALC-SCNC: 6 MMOL/L (ref 5–15)
BUN SERPL-MCNC: 16 MG/DL (ref 6–20)
BUN/CREAT SERPL: 17 (ref 12–20)
CALCIUM SERPL-MCNC: 9.8 MG/DL (ref 8.5–10.1)
CALCIUM SERPL-MCNC: 9.9 MG/DL (ref 8.5–10.1)
CHLORIDE SERPL-SCNC: 107 MMOL/L (ref 97–108)
CO2 SERPL-SCNC: 28 MMOL/L (ref 21–32)
CREAT SERPL-MCNC: 0.96 MG/DL (ref 0.55–1.02)
EST. AVERAGE GLUCOSE BLD GHB EST-MCNC: 160 MG/DL
GLUCOSE SERPL-MCNC: 183 MG/DL (ref 65–100)
HBA1C MFR BLD: 7.2 % (ref 4–5.6)
HBV SURFACE AB SER QL: NONREACTIVE
HBV SURFACE AB SER-ACNC: 3.48 MIU/ML
HBV SURFACE AG SER QL: 0.13 INDEX
HBV SURFACE AG SER QL: NEGATIVE
POTASSIUM SERPL-SCNC: 3.9 MMOL/L (ref 3.5–5.1)
PTH-INTACT SERPL-MCNC: 27.5 PG/ML (ref 18.4–88)
SODIUM SERPL-SCNC: 141 MMOL/L (ref 136–145)

## 2023-11-23 RX ORDER — METFORMIN HYDROCHLORIDE 500 MG/1
TABLET, EXTENDED RELEASE ORAL
Qty: 270 TABLET | Refills: 3 | Status: SHIPPED | OUTPATIENT
Start: 2023-11-23

## 2023-11-25 LAB — HBV CORE AB SERPL QL IA: NEGATIVE

## 2023-11-28 LAB — CYSTATIN C SERPL-MCNC: 0.98 MG/L (ref 0.78–1.15)

## 2024-03-22 ENCOUNTER — OFFICE VISIT (OUTPATIENT)
Facility: CLINIC | Age: 74
End: 2024-03-22

## 2024-03-22 VITALS
DIASTOLIC BLOOD PRESSURE: 65 MMHG | RESPIRATION RATE: 18 BRPM | WEIGHT: 133.1 LBS | OXYGEN SATURATION: 98 % | TEMPERATURE: 97.1 F | HEIGHT: 64 IN | BODY MASS INDEX: 22.72 KG/M2 | HEART RATE: 88 BPM | SYSTOLIC BLOOD PRESSURE: 107 MMHG

## 2024-03-22 DIAGNOSIS — K76.0 FATTY LIVER: ICD-10-CM

## 2024-03-22 DIAGNOSIS — E11.9 TYPE 2 DIABETES MELLITUS WITHOUT COMPLICATION, WITHOUT LONG-TERM CURRENT USE OF INSULIN (HCC): ICD-10-CM

## 2024-03-22 DIAGNOSIS — Z00.00 MEDICARE ANNUAL WELLNESS VISIT, SUBSEQUENT: Primary | ICD-10-CM

## 2024-03-22 DIAGNOSIS — E78.5 DYSLIPIDEMIA: ICD-10-CM

## 2024-03-22 DIAGNOSIS — R74.8 ELEVATED LIVER ENZYMES: Primary | ICD-10-CM

## 2024-03-22 DIAGNOSIS — I10 PRIMARY HYPERTENSION: ICD-10-CM

## 2024-03-22 DIAGNOSIS — Z12.31 ENCOUNTER FOR SCREENING MAMMOGRAM FOR MALIGNANT NEOPLASM OF BREAST: ICD-10-CM

## 2024-03-22 DIAGNOSIS — I51.89 DIASTOLIC DYSFUNCTION: ICD-10-CM

## 2024-03-22 PROBLEM — Z92.89 HISTORY OF NUCLEAR STRESS TEST: Status: RESOLVED | Noted: 2023-04-18 | Resolved: 2024-03-22

## 2024-03-22 PROBLEM — M67.912 TENDINOPATHY OF LEFT ROTATOR CUFF: Status: RESOLVED | Noted: 2023-05-13 | Resolved: 2024-03-22

## 2024-03-22 LAB
ALBUMIN SERPL-MCNC: 4.3 G/DL (ref 3.5–5)
ALBUMIN/GLOB SERPL: 1.2 (ref 1.1–2.2)
ALP SERPL-CCNC: 64 U/L (ref 45–117)
ALT SERPL-CCNC: 24 U/L (ref 12–78)
ANION GAP SERPL CALC-SCNC: 5 MMOL/L (ref 5–15)
APPEARANCE UR: CLEAR
AST SERPL-CCNC: 48 U/L (ref 15–37)
BACTERIA URNS QL MICRO: NEGATIVE /HPF
BASOPHILS # BLD: 0 K/UL (ref 0–0.1)
BASOPHILS NFR BLD: 1 % (ref 0–1)
BILIRUB SERPL-MCNC: 0.7 MG/DL (ref 0.2–1)
BILIRUB UR QL: NEGATIVE
BUN SERPL-MCNC: 18 MG/DL (ref 6–20)
BUN/CREAT SERPL: 18 (ref 12–20)
CALCIUM SERPL-MCNC: 10.7 MG/DL (ref 8.5–10.1)
CHLORIDE SERPL-SCNC: 104 MMOL/L (ref 97–108)
CHOLEST SERPL-MCNC: 120 MG/DL
CO2 SERPL-SCNC: 29 MMOL/L (ref 21–32)
COLOR UR: ABNORMAL
CREAT SERPL-MCNC: 1 MG/DL (ref 0.55–1.02)
CREAT UR-MCNC: 102 MG/DL
DIFFERENTIAL METHOD BLD: ABNORMAL
EOSINOPHIL # BLD: 0.1 K/UL (ref 0–0.4)
EOSINOPHIL NFR BLD: 2 % (ref 0–7)
EPITH CASTS URNS QL MICRO: ABNORMAL /LPF
ERYTHROCYTE [DISTWIDTH] IN BLOOD BY AUTOMATED COUNT: 14.6 % (ref 11.5–14.5)
EST. AVERAGE GLUCOSE BLD GHB EST-MCNC: 148 MG/DL
GLOBULIN SER CALC-MCNC: 3.7 G/DL (ref 2–4)
GLUCOSE SERPL-MCNC: 192 MG/DL (ref 65–100)
GLUCOSE UR STRIP.AUTO-MCNC: NEGATIVE MG/DL
HBA1C MFR BLD: 6.8 % (ref 4–5.6)
HCT VFR BLD AUTO: 41.2 % (ref 35–47)
HDLC SERPL-MCNC: 54 MG/DL
HDLC SERPL: 2.2 (ref 0–5)
HGB BLD-MCNC: 12.7 G/DL (ref 11.5–16)
HGB UR QL STRIP: NEGATIVE
IMM GRANULOCYTES # BLD AUTO: 0 K/UL (ref 0–0.04)
IMM GRANULOCYTES NFR BLD AUTO: 0 % (ref 0–0.5)
KETONES UR QL STRIP.AUTO: NEGATIVE MG/DL
LDLC SERPL CALC-MCNC: 49.2 MG/DL (ref 0–100)
LEUKOCYTE ESTERASE UR QL STRIP.AUTO: NEGATIVE
LYMPHOCYTES # BLD: 2.5 K/UL (ref 0.8–3.5)
LYMPHOCYTES NFR BLD: 44 % (ref 12–49)
MCH RBC QN AUTO: 28.4 PG (ref 26–34)
MCHC RBC AUTO-ENTMCNC: 30.8 G/DL (ref 30–36.5)
MCV RBC AUTO: 92.2 FL (ref 80–99)
MICROALBUMIN UR-MCNC: 20.6 MG/DL
MICROALBUMIN/CREAT UR-RTO: 202 MG/G (ref 0–30)
MONOCYTES # BLD: 0.6 K/UL (ref 0–1)
MONOCYTES NFR BLD: 10 % (ref 5–13)
NEUTS SEG # BLD: 2.5 K/UL (ref 1.8–8)
NEUTS SEG NFR BLD: 43 % (ref 32–75)
NITRITE UR QL STRIP.AUTO: NEGATIVE
NRBC # BLD: 0 K/UL (ref 0–0.01)
NRBC BLD-RTO: 0 PER 100 WBC
PH UR STRIP: 5 (ref 5–8)
PLATELET # BLD AUTO: 247 K/UL (ref 150–400)
PMV BLD AUTO: 10.1 FL (ref 8.9–12.9)
POTASSIUM SERPL-SCNC: 3.8 MMOL/L (ref 3.5–5.1)
PROT SERPL-MCNC: 8 G/DL (ref 6.4–8.2)
PROT UR STRIP-MCNC: 100 MG/DL
RBC # BLD AUTO: 4.47 M/UL (ref 3.8–5.2)
RBC #/AREA URNS HPF: ABNORMAL /HPF (ref 0–5)
SODIUM SERPL-SCNC: 138 MMOL/L (ref 136–145)
SP GR UR REFRACTOMETRY: 1.02 (ref 1–1.03)
TRIGL SERPL-MCNC: 84 MG/DL
TSH SERPL DL<=0.05 MIU/L-ACNC: 2.25 UIU/ML (ref 0.36–3.74)
URATE CRY URNS QL MICRO: ABNORMAL
UROBILINOGEN UR QL STRIP.AUTO: 0.2 EU/DL (ref 0.2–1)
VLDLC SERPL CALC-MCNC: 16.8 MG/DL
WBC # BLD AUTO: 5.8 K/UL (ref 3.6–11)
WBC URNS QL MICRO: ABNORMAL /HPF (ref 0–4)

## 2024-03-22 ASSESSMENT — PATIENT HEALTH QUESTIONNAIRE - PHQ9
SUM OF ALL RESPONSES TO PHQ QUESTIONS 1-9: 0
1. LITTLE INTEREST OR PLEASURE IN DOING THINGS: NOT AT ALL
SUM OF ALL RESPONSES TO PHQ QUESTIONS 1-9: 0
SUM OF ALL RESPONSES TO PHQ QUESTIONS 1-9: 0
2. FEELING DOWN, DEPRESSED OR HOPELESS: NOT AT ALL
2. FEELING DOWN, DEPRESSED OR HOPELESS: NOT AT ALL
1. LITTLE INTEREST OR PLEASURE IN DOING THINGS: NOT AT ALL
SUM OF ALL RESPONSES TO PHQ9 QUESTIONS 1 & 2: 0
SUM OF ALL RESPONSES TO PHQ9 QUESTIONS 1 & 2: 0
SUM OF ALL RESPONSES TO PHQ QUESTIONS 1-9: 0

## 2024-03-22 ASSESSMENT — LIFESTYLE VARIABLES
HOW MANY STANDARD DRINKS CONTAINING ALCOHOL DO YOU HAVE ON A TYPICAL DAY: PATIENT DOES NOT DRINK
HOW OFTEN DO YOU HAVE A DRINK CONTAINING ALCOHOL: NEVER

## 2024-03-22 NOTE — PROGRESS NOTES
Chief Complaint   Patient presents with    Medicare AWV     \"Have you been to the ER, urgent care clinic since your last visit?  Hospitalized since your last visit?\"    NO    “Have you seen or consulted any other health care providers outside of [facility] since your last visit?”    NO            Click Here for Release of Records Request  
Medicare Annual Wellness Visit    Nithya Hernandez is here for Medicare AWV    Assessment & Plan   Medicare annual wellness visit, subsequent  Recommendations for Preventive Services Due: see orders and patient instructions/AVS.  Recommended screening schedule for the next 5-10 years is provided to the patient in written form: see Patient Instructions/AVS.     No follow-ups on file.     Subjective       Patient's complete Health Risk Assessment and screening values have been reviewed and are found in Flowsheets. The following problems were reviewed today and where indicated follow up appointments were made and/or referrals ordered.    No Positive Risk Factors identified today.                                  Objective   Vitals:    03/22/24 1017   BP: 107/65   Site: Left Upper Arm   Position: Sitting   Pulse: 88   Resp: 18   Temp: 97.1 °F (36.2 °C)   TempSrc: Oral   SpO2: 98%   Weight: 60.4 kg (133 lb 1.6 oz)   Height: 1.626 m (5' 4\")      Body mass index is 22.85 kg/m².               Allergies   Allergen Reactions    Penicillins      Other reaction(s): Unknown (comments)     Prior to Visit Medications    Medication Sig Taking? Authorizing Provider   FIBER, GUAR GUM, PO Take by mouth Yes Provider, MD Andrew   metFORMIN (GLUCOPHAGE-XR) 500 MG extended release tablet TAKE 2 TABLETS BY MOUTH DAILY WITH DINNER and 1 tablet with breakfast Yes Daniel Smith MD   valsartan-hydroCHLOROthiazide (DIOVAN-HCT) 320-12.5 MG per tablet TAKE 1 TABLET EVERY DAY Yes Daniel Smith MD   rosuvastatin (CRESTOR) 20 MG tablet TAKE 1 TABLET EVERY NIGHT Yes Daniel Smith MD   metoprolol succinate (TOPROL XL) 25 MG extended release tablet Take 1 tablet by mouth daily Yes Automatic Reconciliation, Ar       CareTeam (Including outside providers/suppliers regularly involved in providing care):   Patient Care Team:  Daniel Smith MD as PCP - General  Daniel Smith MD as PCP - Empaneled Provider     
Diastolic dysfunction    7. Encounter for screening mammogram for malignant neoplasm of breast       Dictation on: 03/22/2024 11:30 AM by: PACO LEY [[id number]]   Patient was unable to increase her Metformin to 1,500 mg a day as the cause of diarrhea.  She therefore cut it back to 1,000 mg a day.  If hemoglobin A1c remains elevated, we will add a different agent, perhaps an SGLT2 or a DPP-4 inhibitor.        I have discussed the diagnosis with the patient and the intended plan as seen in the  Orders.  The patient understands and agees with the plan.  The patient has   received an after visit summary and questions were answered concerning  future plans  Patient labs and/or xrays were reviewed  Past records were reviewed.    PLAN:  Orders Placed This Encounter   Procedures    NICKOLAS YAZMIN DIGITAL SCREEN BILATERAL     Standing Status:   Future     Standing Expiration Date:   5/22/2025    TSH     Standing Status:   Future     Number of Occurrences:   1     Standing Expiration Date:   3/22/2025    Urinalysis with Microscopic     Standing Status:   Future     Number of Occurrences:   1     Standing Expiration Date:   3/22/2025     Order Specific Question:   SPECIFY(EX-CATH,MIDSTREAM,CYSTO,ETC)?     Answer:   ms    Lipid Panel     Standing Status:   Future     Number of Occurrences:   1     Standing Expiration Date:   3/22/2025    Comprehensive Metabolic Panel    CBC with Auto Differential     Standing Status:   Future     Number of Occurrences:   1     Standing Expiration Date:   3/22/2025    Microalbumin / Creatinine Urine Ratio     Standing Status:   Future     Number of Occurrences:   1     Standing Expiration Date:   3/22/2025    Hemoglobin A1C     Standing Status:   Future     Number of Occurrences:   1     Standing Expiration Date:   3/22/2025    FULL CODE    VT COLLECTION VENOUS BLOOD VENIPUNCTURE        Follow-up and Dispositions    Return in about 4 months (around 7/22/2024).                ATTENTION:   This

## 2024-03-22 NOTE — PATIENT INSTRUCTIONS
every 1-2 years to screen for glaucoma; cataracts, macular degeneration, and other eye disorders.  A preventive dental visit is recommended every 6 months.  Try to get at least 150 minutes of exercise per week or 10,000 steps per day on a pedometer .  Order or download the FREE \"Exercise & Physical Activity: Your Everyday Guide\" from [employer]. Call 1-886.621.3165 or search [employer] online.  You need 2298-9780 mg of calcium and 2744-2928 IU of vitamin D per day. It is possible to meet your calcium requirement with diet alone, but a vitamin D supplement is usually necessary to meet this goal.  When exposed to the sun, use a sunscreen that protects against both UVA and UVB radiation with an SPF of 30 or greater. Reapply every 2 to 3 hours or after sweating, drying off with a towel, or swimming.  Always wear a seat belt when traveling in a car. Always wear a helmet when riding a bicycle or motorcycle.

## 2024-04-02 ENCOUNTER — HOSPITAL ENCOUNTER (OUTPATIENT)
Facility: HOSPITAL | Age: 74
Discharge: HOME | End: 2024-04-05
Attending: INTERNAL MEDICINE
Payer: MEDICARE

## 2024-04-02 DIAGNOSIS — E11.9 TYPE 2 DIABETES MELLITUS WITHOUT COMPLICATION, WITHOUT LONG-TERM CURRENT USE OF INSULIN (HCC): ICD-10-CM

## 2024-04-02 DIAGNOSIS — Z12.31 ENCOUNTER FOR SCREENING MAMMOGRAM FOR MALIGNANT NEOPLASM OF BREAST: ICD-10-CM

## 2024-04-02 DIAGNOSIS — R74.8 ELEVATED LIVER ENZYMES: ICD-10-CM

## 2024-04-02 PROCEDURE — 76700 US EXAM ABDOM COMPLETE: CPT

## 2024-04-02 PROCEDURE — 77063 BREAST TOMOSYNTHESIS BI: CPT

## 2024-04-03 PROBLEM — K80.20 CHOLELITHIASIS: Status: ACTIVE | Noted: 2024-04-02

## 2024-04-03 PROBLEM — K76.0 HEPATIC STEATOSIS: Status: ACTIVE | Noted: 2024-04-02

## 2024-07-22 ENCOUNTER — TELEPHONE (OUTPATIENT)
Facility: CLINIC | Age: 74
End: 2024-07-22

## 2024-07-23 RX ORDER — METOPROLOL SUCCINATE 25 MG/1
25 TABLET, EXTENDED RELEASE ORAL DAILY
Qty: 90 TABLET | Refills: 3 | Status: SHIPPED | OUTPATIENT
Start: 2024-07-23

## 2024-07-26 ENCOUNTER — OFFICE VISIT (OUTPATIENT)
Facility: CLINIC | Age: 74
End: 2024-07-26
Payer: MEDICARE

## 2024-07-26 VITALS
SYSTOLIC BLOOD PRESSURE: 127 MMHG | BODY MASS INDEX: 22.61 KG/M2 | DIASTOLIC BLOOD PRESSURE: 76 MMHG | RESPIRATION RATE: 18 BRPM | HEIGHT: 64 IN | HEART RATE: 82 BPM | OXYGEN SATURATION: 98 % | TEMPERATURE: 97.6 F | WEIGHT: 132.4 LBS

## 2024-07-26 DIAGNOSIS — K76.0 HEPATIC STEATOSIS: ICD-10-CM

## 2024-07-26 DIAGNOSIS — I10 PRIMARY HYPERTENSION: ICD-10-CM

## 2024-07-26 DIAGNOSIS — E11.9 TYPE 2 DIABETES MELLITUS WITHOUT COMPLICATION, WITHOUT LONG-TERM CURRENT USE OF INSULIN (HCC): Primary | ICD-10-CM

## 2024-07-26 DIAGNOSIS — E78.5 DYSLIPIDEMIA: ICD-10-CM

## 2024-07-26 LAB
ALBUMIN SERPL-MCNC: 3.7 G/DL (ref 3.5–5)
ALBUMIN/GLOB SERPL: 1.1 (ref 1.1–2.2)
ALP SERPL-CCNC: 57 U/L (ref 45–117)
ALT SERPL-CCNC: 22 U/L (ref 12–78)
AST SERPL-CCNC: 45 U/L (ref 15–37)
BASOPHILS # BLD: 0.1 K/UL (ref 0–0.1)
BASOPHILS NFR BLD: 1 % (ref 0–1)
BILIRUB DIRECT SERPL-MCNC: 0.2 MG/DL (ref 0–0.2)
BILIRUB SERPL-MCNC: 0.6 MG/DL (ref 0.2–1)
DIFFERENTIAL METHOD BLD: ABNORMAL
EOSINOPHIL # BLD: 0.1 K/UL (ref 0–0.4)
EOSINOPHIL NFR BLD: 2 % (ref 0–7)
ERYTHROCYTE [DISTWIDTH] IN BLOOD BY AUTOMATED COUNT: 14.1 % (ref 11.5–14.5)
EST. AVERAGE GLUCOSE BLD GHB EST-MCNC: 154 MG/DL
GLOBULIN SER CALC-MCNC: 3.5 G/DL (ref 2–4)
HBA1C MFR BLD: 7 % (ref 4–5.6)
HCT VFR BLD AUTO: 35.2 % (ref 35–47)
HCV AB SER IA-ACNC: 0.06 INDEX
HCV AB SERPL QL IA: NONREACTIVE
HGB BLD-MCNC: 11.4 G/DL (ref 11.5–16)
IMM GRANULOCYTES # BLD AUTO: 0 K/UL (ref 0–0.04)
IMM GRANULOCYTES NFR BLD AUTO: 0 % (ref 0–0.5)
LYMPHOCYTES # BLD: 1.9 K/UL (ref 0.8–3.5)
LYMPHOCYTES NFR BLD: 38 % (ref 12–49)
MCH RBC QN AUTO: 28.7 PG (ref 26–34)
MCHC RBC AUTO-ENTMCNC: 32.4 G/DL (ref 30–36.5)
MCV RBC AUTO: 88.7 FL (ref 80–99)
MONOCYTES # BLD: 0.4 K/UL (ref 0–1)
MONOCYTES NFR BLD: 7 % (ref 5–13)
NEUTS SEG # BLD: 2.6 K/UL (ref 1.8–8)
NEUTS SEG NFR BLD: 52 % (ref 32–75)
NRBC # BLD: 0 K/UL (ref 0–0.01)
NRBC BLD-RTO: 0 PER 100 WBC
PLATELET # BLD AUTO: 245 K/UL (ref 150–400)
PMV BLD AUTO: 9.8 FL (ref 8.9–12.9)
PROT SERPL-MCNC: 7.2 G/DL (ref 6.4–8.2)
RBC # BLD AUTO: 3.97 M/UL (ref 3.8–5.2)
WBC # BLD AUTO: 5 K/UL (ref 3.6–11)

## 2024-07-26 PROCEDURE — G8420 CALC BMI NORM PARAMETERS: HCPCS | Performed by: INTERNAL MEDICINE

## 2024-07-26 PROCEDURE — 3078F DIAST BP <80 MM HG: CPT | Performed by: INTERNAL MEDICINE

## 2024-07-26 PROCEDURE — 1123F ACP DISCUSS/DSCN MKR DOCD: CPT | Performed by: INTERNAL MEDICINE

## 2024-07-26 PROCEDURE — 99214 OFFICE O/P EST MOD 30 MIN: CPT | Performed by: INTERNAL MEDICINE

## 2024-07-26 PROCEDURE — 3044F HG A1C LEVEL LT 7.0%: CPT | Performed by: INTERNAL MEDICINE

## 2024-07-26 PROCEDURE — 36415 COLL VENOUS BLD VENIPUNCTURE: CPT | Performed by: INTERNAL MEDICINE

## 2024-07-26 PROCEDURE — G8399 PT W/DXA RESULTS DOCUMENT: HCPCS | Performed by: INTERNAL MEDICINE

## 2024-07-26 PROCEDURE — 3017F COLORECTAL CA SCREEN DOC REV: CPT | Performed by: INTERNAL MEDICINE

## 2024-07-26 PROCEDURE — 1090F PRES/ABSN URINE INCON ASSESS: CPT | Performed by: INTERNAL MEDICINE

## 2024-07-26 PROCEDURE — G8427 DOCREV CUR MEDS BY ELIG CLIN: HCPCS | Performed by: INTERNAL MEDICINE

## 2024-07-26 PROCEDURE — 2022F DILAT RTA XM EVC RTNOPTHY: CPT | Performed by: INTERNAL MEDICINE

## 2024-07-26 PROCEDURE — 3074F SYST BP LT 130 MM HG: CPT | Performed by: INTERNAL MEDICINE

## 2024-07-26 PROCEDURE — 1036F TOBACCO NON-USER: CPT | Performed by: INTERNAL MEDICINE

## 2024-07-26 RX ORDER — CLINDAMYCIN HYDROCHLORIDE 150 MG/1
150 CAPSULE ORAL 4 TIMES DAILY
COMMUNITY
Start: 2024-07-23 | End: 2024-07-30

## 2024-07-26 NOTE — PROGRESS NOTES
Chief Complaint   Patient presents with    Follow-up    Diabetes       \"Have you been to the ER, urgent care clinic since your last visit?  Hospitalized since your last visit?\"    NO    “Have you seen or consulted any other health care providers outside of [facility] since your last visit?”    NO        “Have you had a colorectal cancer screening such as a colonoscopy/FIT/Cologuard?    NO    Date of last Colonoscopy: 6/15/2014  No cologuard on file  No FIT/FOBT on file   No flexible sigmoidoscopy on file         Click Here for Release of Records Request

## 2024-07-26 NOTE — PROGRESS NOTES
[facility]  Paco Smith MD, FACP, CMD  [facility] [facility] [address]  Phone:  [phone]  Fax: [phone]       Chief Complaint   Patient presents with    Follow-up    Diabetes   .      SUBJECTIVE:    Nithya Hernandez is a 74 y.o. female  Dictation on: 07/26/2024 11:31 AM by: PACO SMITH [[id number]]          Current Outpatient Medications   Medication Sig Dispense Refill    clindamycin (CLEOCIN) 150 MG capsule Take 1 capsule by mouth 4 times daily      metoprolol succinate (TOPROL XL) 25 MG extended release tablet Take 1 tablet by mouth daily 90 tablet 3    FIBER, GUAR GUM, PO Take by mouth      metFORMIN (GLUCOPHAGE-XR) 500 MG extended release tablet TAKE 2 TABLETS BY MOUTH DAILY WITH DINNER and 1 tablet with breakfast 270 tablet 3    valsartan-hydroCHLOROthiazide (DIOVAN-HCT) 320-12.5 MG per tablet TAKE 1 TABLET EVERY DAY 90 tablet 3    rosuvastatin (CRESTOR) 20 MG tablet TAKE 1 TABLET EVERY NIGHT 90 tablet 3     No current facility-administered medications for this visit.     Past Medical History:   Diagnosis Date    Cholelithiasis 04/02/2024    us    Hepatic steatosis 04/02/2024    us    Hypertension     Osteoarthritis     Tendinopathy of left rotator cuff 05/13/2023    MRI: 1.  Left supraspinatus and infraspinatus tendinopathy with a full-thickness supraspinatus tendon tear.  Mild glenohumeral and acromioclavicular joint osteoarthritis.  Subacromial spur    Type 2 diabetes mellitus without complication (HCC)      Past Surgical History:   Procedure Laterality Date    US ASP BREAST CYST LEFT Left 10/19/2020    US BREAST CYST ASPIRATION LEFT 10/19/2020 [facility]     Allergies   Allergen Reactions    Penicillins      Other reaction(s): Unknown (comments)         REVIEW OF SYSTEMS:  General: negative for - chills or fever  ENT: negative for - headaches, nasal congestion or tinnitus  Respiratory: negative for - cough, hemoptysis, shortness of breath or

## 2024-09-22 RX ORDER — VALSARTAN AND HYDROCHLOROTHIAZIDE 320; 12.5 MG/1; MG/1
1 TABLET, FILM COATED ORAL DAILY
Qty: 90 TABLET | Refills: 3 | Status: SHIPPED | OUTPATIENT
Start: 2024-09-22

## 2024-09-22 RX ORDER — ROSUVASTATIN CALCIUM 20 MG/1
20 TABLET, COATED ORAL
Qty: 90 TABLET | Refills: 3 | Status: SHIPPED | OUTPATIENT
Start: 2024-09-22

## 2024-11-23 DIAGNOSIS — E11.9 TYPE 2 DIABETES MELLITUS WITHOUT COMPLICATION, WITHOUT LONG-TERM CURRENT USE OF INSULIN (HCC): Primary | ICD-10-CM

## 2024-11-23 RX ORDER — METFORMIN HYDROCHLORIDE 500 MG/1
TABLET, EXTENDED RELEASE ORAL
Qty: 270 TABLET | Refills: 3 | Status: SHIPPED | OUTPATIENT
Start: 2024-11-23

## 2024-11-25 SDOH — ECONOMIC STABILITY: FOOD INSECURITY: WITHIN THE PAST 12 MONTHS, YOU WORRIED THAT YOUR FOOD WOULD RUN OUT BEFORE YOU GOT MONEY TO BUY MORE.: NEVER TRUE

## 2024-11-25 SDOH — ECONOMIC STABILITY: FOOD INSECURITY: WITHIN THE PAST 12 MONTHS, THE FOOD YOU BOUGHT JUST DIDN'T LAST AND YOU DIDN'T HAVE MONEY TO GET MORE.: NEVER TRUE

## 2024-11-25 SDOH — ECONOMIC STABILITY: INCOME INSECURITY: HOW HARD IS IT FOR YOU TO PAY FOR THE VERY BASICS LIKE FOOD, HOUSING, MEDICAL CARE, AND HEATING?: NOT HARD AT ALL

## 2024-11-27 ENCOUNTER — OFFICE VISIT (OUTPATIENT)
Facility: CLINIC | Age: 74
End: 2024-11-27
Payer: MEDICARE

## 2024-11-27 VITALS
TEMPERATURE: 98 F | SYSTOLIC BLOOD PRESSURE: 136 MMHG | BODY MASS INDEX: 21.85 KG/M2 | RESPIRATION RATE: 14 BRPM | DIASTOLIC BLOOD PRESSURE: 80 MMHG | OXYGEN SATURATION: 97 % | WEIGHT: 128 LBS | HEIGHT: 64 IN | HEART RATE: 90 BPM

## 2024-11-27 DIAGNOSIS — E11.9 TYPE 2 DIABETES MELLITUS WITHOUT COMPLICATION, WITHOUT LONG-TERM CURRENT USE OF INSULIN (HCC): Primary | ICD-10-CM

## 2024-11-27 DIAGNOSIS — I10 PRIMARY HYPERTENSION: ICD-10-CM

## 2024-11-27 DIAGNOSIS — E78.5 DYSLIPIDEMIA: ICD-10-CM

## 2024-11-27 DIAGNOSIS — K76.0 FATTY LIVER: ICD-10-CM

## 2024-11-27 PROCEDURE — G8428 CUR MEDS NOT DOCUMENT: HCPCS | Performed by: INTERNAL MEDICINE

## 2024-11-27 PROCEDURE — G8420 CALC BMI NORM PARAMETERS: HCPCS | Performed by: INTERNAL MEDICINE

## 2024-11-27 PROCEDURE — 3079F DIAST BP 80-89 MM HG: CPT | Performed by: INTERNAL MEDICINE

## 2024-11-27 PROCEDURE — 3075F SYST BP GE 130 - 139MM HG: CPT | Performed by: INTERNAL MEDICINE

## 2024-11-27 PROCEDURE — G8484 FLU IMMUNIZE NO ADMIN: HCPCS | Performed by: INTERNAL MEDICINE

## 2024-11-27 PROCEDURE — 1125F AMNT PAIN NOTED PAIN PRSNT: CPT | Performed by: INTERNAL MEDICINE

## 2024-11-27 PROCEDURE — 99214 OFFICE O/P EST MOD 30 MIN: CPT | Performed by: INTERNAL MEDICINE

## 2024-11-27 PROCEDURE — 1123F ACP DISCUSS/DSCN MKR DOCD: CPT | Performed by: INTERNAL MEDICINE

## 2024-11-27 PROCEDURE — 2022F DILAT RTA XM EVC RTNOPTHY: CPT | Performed by: INTERNAL MEDICINE

## 2024-11-27 PROCEDURE — 1090F PRES/ABSN URINE INCON ASSESS: CPT | Performed by: INTERNAL MEDICINE

## 2024-11-27 PROCEDURE — 1036F TOBACCO NON-USER: CPT | Performed by: INTERNAL MEDICINE

## 2024-11-27 PROCEDURE — 3051F HG A1C>EQUAL 7.0%<8.0%: CPT | Performed by: INTERNAL MEDICINE

## 2024-11-27 PROCEDURE — 3017F COLORECTAL CA SCREEN DOC REV: CPT | Performed by: INTERNAL MEDICINE

## 2024-11-27 PROCEDURE — G8399 PT W/DXA RESULTS DOCUMENT: HCPCS | Performed by: INTERNAL MEDICINE

## 2024-11-27 PROCEDURE — 36415 COLL VENOUS BLD VENIPUNCTURE: CPT | Performed by: INTERNAL MEDICINE

## 2024-11-27 NOTE — ASSESSMENT & PLAN NOTE
borderline controlled, continue current medications
Patient's first and last name, , procedure, and correct site confirmed prior to the start of procedure.

## 2024-11-27 NOTE — PROGRESS NOTES
Nithya Hernandez is a 74 y.o. female presenting for Diabetes and Follow-up      /80   Pulse 90   Temp 98 °F (36.7 °C) (Oral)   Resp 14   Ht 1.626 m (5' 4\")   Wt 58.1 kg (128 lb)   SpO2 97%   BMI 21.97 kg/m²       Current Outpatient Medications   Medication Sig Dispense Refill    metFORMIN (GLUCOPHAGE-XR) 500 MG extended release tablet TAKE 1 TABLET WITH BREAKFAST AND TAKE 2 TABLETS WITH DINNER 270 tablet 3    valsartan-hydroCHLOROthiazide (DIOVAN-HCT) 320-12.5 MG per tablet TAKE 1 TABLET EVERY DAY 90 tablet 3    rosuvastatin (CRESTOR) 20 MG tablet TAKE 1 TABLET EVERY NIGHT 90 tablet 3    metoprolol succinate (TOPROL XL) 25 MG extended release tablet Take 1 tablet by mouth daily 90 tablet 3    FIBER, GUAR GUM, PO Take by mouth       No current facility-administered medications for this visit.        1. \"Have you been to the ER, urgent care clinic since your last visit?  Hospitalized since your last visit?\" No    2. \"Have you seen or consulted any other health care providers outside of the [facility] since your last visit?\" Yes Where: [facility]      3. For patients aged 45-75: Has the patient had a colonoscopy / FIT/ Cologuard? No      If the patient is female:    4. For patients aged 40-74: Has the patient had a mammogram within the past 2 years? NA - based on age or sex      5. For patients aged 21-65: Has the patient had a pap smear? NA - based on age or sex

## 2024-11-27 NOTE — PROGRESS NOTES
[facility]ICINE [facility]  Daniel Smith MD, FACP, CMD  [address] [facility] [address]  Phone:  [phone]  Fax: [phone]       Chief Complaint   Patient presents with    Diabetes    Follow-up   .      SUBJECTIVE:  History of Present Illness         Nithya Hernandez is a 74 y.o. female The patient is here today for an evaluation of her known conditions, which include type 2 diabetes mellitus, fatty liver, primary hypertension, and dyslipidemia.    She reports no current discomfort.    She underwent oral maxillofacial surgery performed by Dr. 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 The primary surgeon was Dr. Juancho Daniels. 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 She tolerated the procedure well.    She mentions that she feels well on one side but is experiencing sinus issues on the other side, where the cyst was located. She recalls having a broken jaw bone 30 years ago, which required the placement of plates on both sides. These plates were removed during her recent surgery.    Since July 2024, she has been unable to wear her dentures and has been consuming soft food. She has lost weight due to her inability to eat normally. Her appetite fluctuates, and she believes she may be slightly dehydrated. She has been making an effort to drink more water. She has been trying to walk but has been unable to do so since her surgery.    She has an appointment with her surgeon next Monday. She applies Vaseline to her feet.       Current Outpatient Medications   Medication Sig Dispense Refill    metFORMIN (GLUCOPHAGE-XR) 500 MG extended release tablet TAKE 1 TABLET WITH BREAKFAST AND TAKE 2 TABLETS WITH DINNER 270 tablet 3    valsartan-hydroCHLOROthiazide (DIOVAN-HCT) 320-12.5 MG per tablet TAKE 1 TABLET EVERY DAY 90

## 2024-11-28 DIAGNOSIS — K76.0 HEPATIC STEATOSIS: Primary | ICD-10-CM

## 2024-11-28 LAB
ALBUMIN SERPL-MCNC: 4.4 G/DL (ref 3.5–5)
ALBUMIN/GLOB SERPL: 1.1 (ref 1.1–2.2)
ALP SERPL-CCNC: 67 U/L (ref 45–117)
ALT SERPL-CCNC: 23 U/L (ref 12–78)
ANION GAP SERPL CALC-SCNC: 8 MMOL/L (ref 2–12)
AST SERPL-CCNC: 56 U/L (ref 15–37)
BILIRUB DIRECT SERPL-MCNC: 0.3 MG/DL (ref 0–0.2)
BILIRUB SERPL-MCNC: 1 MG/DL (ref 0.2–1)
BUN SERPL-MCNC: 15 MG/DL (ref 6–20)
BUN/CREAT SERPL: 15 (ref 12–20)
CALCIUM SERPL-MCNC: 10.3 MG/DL (ref 8.5–10.1)
CALCIUM SERPL-MCNC: 10.6 MG/DL (ref 8.5–10.1)
CHLORIDE SERPL-SCNC: 105 MMOL/L (ref 97–108)
CO2 SERPL-SCNC: 28 MMOL/L (ref 21–32)
CREAT SERPL-MCNC: 0.97 MG/DL (ref 0.55–1.02)
EST. AVERAGE GLUCOSE BLD GHB EST-MCNC: 151 MG/DL
GLOBULIN SER CALC-MCNC: 4 G/DL (ref 2–4)
GLUCOSE SERPL-MCNC: 167 MG/DL (ref 65–100)
HBA1C MFR BLD: 6.9 % (ref 4–5.6)
POTASSIUM SERPL-SCNC: 3.9 MMOL/L (ref 3.5–5.1)
PROT SERPL-MCNC: 8.4 G/DL (ref 6.4–8.2)
PTH-INTACT SERPL-MCNC: 31.8 PG/ML (ref 18.4–88)
SODIUM SERPL-SCNC: 141 MMOL/L (ref 136–145)

## 2024-12-03 LAB
ALBUMIN SERPL ELPH-MCNC: 4.2 G/DL (ref 2.9–4.4)
ALBUMIN/GLOB SERPL: 1.2 (ref 0.7–1.7)
ALPHA1 GLOB SERPL ELPH-MCNC: 0.2 G/DL (ref 0–0.4)
ALPHA2 GLOB SERPL ELPH-MCNC: 0.8 G/DL (ref 0.4–1)
B-GLOBULIN SERPL ELPH-MCNC: 1.2 G/DL (ref 0.7–1.3)
GAMMA GLOB SERPL ELPH-MCNC: 1.3 G/DL (ref 0.4–1.8)
GLOBULIN SER-MCNC: 3.6 G/DL (ref 2.2–3.9)
IGA SERPL-MCNC: 230 MG/DL (ref 64–422)
IGG SERPL-MCNC: 1337 MG/DL (ref 586–1602)
IGM SERPL-MCNC: 42 MG/DL (ref 26–217)
INTERPRETATION SERPL IEP-IMP: ABNORMAL
KAPPA LC FREE SER-MCNC: 28.2 MG/L (ref 3.3–19.4)
KAPPA LC FREE/LAMBDA FREE SER: 1.86 (ref 0.26–1.65)
LAMBDA LC FREE SERPL-MCNC: 15.2 MG/L (ref 5.7–26.3)
M PROTEIN SERPL ELPH-MCNC: ABNORMAL G/DL
PROT SERPL-MCNC: 7.8 G/DL (ref 6–8.5)

## 2024-12-09 ENCOUNTER — CLINICAL DOCUMENTATION (OUTPATIENT)
Age: 74
End: 2024-12-09

## 2024-12-09 NOTE — PROGRESS NOTES
12/9/2024 1508: New pt referral received. Referred by Daniel Smith MD at [facility]. Dx: Fatty liver. Routine appt. Records in EPIC

## 2025-03-26 SDOH — ECONOMIC STABILITY: FOOD INSECURITY: WITHIN THE PAST 12 MONTHS, YOU WORRIED THAT YOUR FOOD WOULD RUN OUT BEFORE YOU GOT MONEY TO BUY MORE.: NEVER TRUE

## 2025-03-26 SDOH — ECONOMIC STABILITY: FOOD INSECURITY: WITHIN THE PAST 12 MONTHS, THE FOOD YOU BOUGHT JUST DIDN'T LAST AND YOU DIDN'T HAVE MONEY TO GET MORE.: NEVER TRUE

## 2025-03-26 SDOH — ECONOMIC STABILITY: INCOME INSECURITY: IN THE LAST 12 MONTHS, WAS THERE A TIME WHEN YOU WERE NOT ABLE TO PAY THE MORTGAGE OR RENT ON TIME?: NO

## 2025-03-26 SDOH — ECONOMIC STABILITY: TRANSPORTATION INSECURITY
IN THE PAST 12 MONTHS, HAS THE LACK OF TRANSPORTATION KEPT YOU FROM MEDICAL APPOINTMENTS OR FROM GETTING MEDICATIONS?: NO

## 2025-04-08 ENCOUNTER — TELEPHONE (OUTPATIENT)
Age: 75
End: 2025-04-08

## 2025-04-08 NOTE — TELEPHONE ENCOUNTER
L/M to schedule NP appt.    New pt referral received. Referred by Daniel Smith MD at [facility]. Dx: Fatty liver. Routine appt. Records in EPIC

## 2025-05-02 RX ORDER — METOPROLOL SUCCINATE 25 MG/1
25 TABLET, EXTENDED RELEASE ORAL DAILY
Qty: 90 TABLET | Refills: 3 | Status: SHIPPED | OUTPATIENT
Start: 2025-05-02

## 2025-05-07 ENCOUNTER — OFFICE VISIT (OUTPATIENT)
Facility: CLINIC | Age: 75
End: 2025-05-07
Payer: MEDICARE

## 2025-05-07 VITALS
HEIGHT: 64 IN | OXYGEN SATURATION: 97 % | BODY MASS INDEX: 22.23 KG/M2 | HEART RATE: 72 BPM | RESPIRATION RATE: 16 BRPM | TEMPERATURE: 98 F | SYSTOLIC BLOOD PRESSURE: 137 MMHG | WEIGHT: 130.2 LBS | DIASTOLIC BLOOD PRESSURE: 82 MMHG

## 2025-05-07 DIAGNOSIS — K80.20 GALL BLADDER STONES: ICD-10-CM

## 2025-05-07 DIAGNOSIS — I51.89 DIASTOLIC DYSFUNCTION: ICD-10-CM

## 2025-05-07 DIAGNOSIS — K76.0 FATTY LIVER: ICD-10-CM

## 2025-05-07 DIAGNOSIS — Z12.31 ENCOUNTER FOR SCREENING MAMMOGRAM FOR MALIGNANT NEOPLASM OF BREAST: ICD-10-CM

## 2025-05-07 DIAGNOSIS — E78.5 DYSLIPIDEMIA: ICD-10-CM

## 2025-05-07 DIAGNOSIS — I10 PRIMARY HYPERTENSION: ICD-10-CM

## 2025-05-07 DIAGNOSIS — Z53.20 COLONOSCOPY REFUSED: ICD-10-CM

## 2025-05-07 DIAGNOSIS — Z00.00 MEDICARE ANNUAL WELLNESS VISIT, SUBSEQUENT: Primary | ICD-10-CM

## 2025-05-07 DIAGNOSIS — E11.9 TYPE 2 DIABETES MELLITUS WITHOUT COMPLICATION, WITHOUT LONG-TERM CURRENT USE OF INSULIN (HCC): ICD-10-CM

## 2025-05-07 PROCEDURE — 2022F DILAT RTA XM EVC RTNOPTHY: CPT | Performed by: INTERNAL MEDICINE

## 2025-05-07 PROCEDURE — 3079F DIAST BP 80-89 MM HG: CPT | Performed by: INTERNAL MEDICINE

## 2025-05-07 PROCEDURE — 3046F HEMOGLOBIN A1C LEVEL >9.0%: CPT | Performed by: INTERNAL MEDICINE

## 2025-05-07 PROCEDURE — G8427 DOCREV CUR MEDS BY ELIG CLIN: HCPCS | Performed by: INTERNAL MEDICINE

## 2025-05-07 PROCEDURE — G8399 PT W/DXA RESULTS DOCUMENT: HCPCS | Performed by: INTERNAL MEDICINE

## 2025-05-07 PROCEDURE — G0439 PPPS, SUBSEQ VISIT: HCPCS | Performed by: INTERNAL MEDICINE

## 2025-05-07 PROCEDURE — 1090F PRES/ABSN URINE INCON ASSESS: CPT | Performed by: INTERNAL MEDICINE

## 2025-05-07 PROCEDURE — 1159F MED LIST DOCD IN RCRD: CPT | Performed by: INTERNAL MEDICINE

## 2025-05-07 PROCEDURE — 1036F TOBACCO NON-USER: CPT | Performed by: INTERNAL MEDICINE

## 2025-05-07 PROCEDURE — 1126F AMNT PAIN NOTED NONE PRSNT: CPT | Performed by: INTERNAL MEDICINE

## 2025-05-07 PROCEDURE — 99214 OFFICE O/P EST MOD 30 MIN: CPT | Performed by: INTERNAL MEDICINE

## 2025-05-07 PROCEDURE — 1123F ACP DISCUSS/DSCN MKR DOCD: CPT | Performed by: INTERNAL MEDICINE

## 2025-05-07 PROCEDURE — 3017F COLORECTAL CA SCREEN DOC REV: CPT | Performed by: INTERNAL MEDICINE

## 2025-05-07 PROCEDURE — 3075F SYST BP GE 130 - 139MM HG: CPT | Performed by: INTERNAL MEDICINE

## 2025-05-07 PROCEDURE — G8420 CALC BMI NORM PARAMETERS: HCPCS | Performed by: INTERNAL MEDICINE

## 2025-05-07 ASSESSMENT — ANXIETY QUESTIONNAIRES
2. NOT BEING ABLE TO STOP OR CONTROL WORRYING: NOT AT ALL
4. TROUBLE RELAXING: NOT AT ALL
7. FEELING AFRAID AS IF SOMETHING AWFUL MIGHT HAPPEN: NOT AT ALL
IF YOU CHECKED OFF ANY PROBLEMS ON THIS QUESTIONNAIRE, HOW DIFFICULT HAVE THESE PROBLEMS MADE IT FOR YOU TO DO YOUR WORK, TAKE CARE OF THINGS AT HOME, OR GET ALONG WITH OTHER PEOPLE: NOT DIFFICULT AT ALL
6. BECOMING EASILY ANNOYED OR IRRITABLE: NOT AT ALL
3. WORRYING TOO MUCH ABOUT DIFFERENT THINGS: NOT AT ALL
1. FEELING NERVOUS, ANXIOUS, OR ON EDGE: NOT AT ALL
5. BEING SO RESTLESS THAT IT IS HARD TO SIT STILL: NOT AT ALL
GAD7 TOTAL SCORE: 0

## 2025-05-07 ASSESSMENT — PATIENT HEALTH QUESTIONNAIRE - PHQ9
2. FEELING DOWN, DEPRESSED OR HOPELESS: NOT AT ALL
SUM OF ALL RESPONSES TO PHQ QUESTIONS 1-9: 0
1. LITTLE INTEREST OR PLEASURE IN DOING THINGS: NOT AT ALL

## 2025-05-07 NOTE — PROGRESS NOTES
Medicare Annual Wellness Visit    Nithya Hernandez is here for Medicare AWV    Assessment & Plan   Medicare annual wellness visit, subsequent     No follow-ups on file.     Subjective       Patient's complete Health Risk Assessment and screening values have been reviewed and are found in Flowsheets. The following problems were reviewed today and where indicated follow up appointments were made and/or referrals ordered.    Positive Risk Factor Screenings with Interventions:              Inactivity:  On average, how many days per week do you engage in moderate to strenuous exercise (like a brisk walk)?: 2 days (!) Abnormal  On average, how many minutes do you engage in exercise at this level?: 20 min  Interventions:  See A/P for plan and any pertinent orders           Advanced Directives:  Do you have a Living Will?: (!) No    Intervention:  has an advanced directive - a copy HAS NOT been provided.                                     Objective   Vitals:    05/07/25 1241   BP: 137/82   BP Site: Left Upper Arm   Patient Position: Sitting   BP Cuff Size: Medium Adult   Pulse: 72   Resp: 16   Temp: 98 °F (36.7 °C)   TempSrc: Oral   SpO2: 97%   Weight: 59.1 kg (130 lb 3.2 oz)   Height: 1.626 m (5' 4\")      Body mass index is 22.35 kg/m².        General Appearance: alert and oriented to person, place and time, well developed and well- nourished, in no acute distress  Skin: warm and dry, no rash or erythema  Head: normocephalic and atraumatic  Eyes: pupils equal, round, and reactive to light, extraocular eye movements intact, conjunctivae normal  ENT: tympanic membrane, external ear and ear canal normal bilaterally, nose without deformity, nasal mucosa and turbinates normal without polyps  Neck: supple and non-tender without mass, no thyromegaly or thyroid nodules, no cervical lymphadenopathy  Pulmonary/Chest: clear to auscultation bilaterally- no wheezes, rales or rhonchi, normal air movement, no respiratory

## 2025-05-07 NOTE — PATIENT INSTRUCTIONS
Learning About Being Active as an Older Adult  Why is being active important as you get older?     Being active is one of the best things you can do for your health. And it's never too late to start. Being active--or getting active, if you aren't already--has definite benefits. It can:  Give you more energy,  Keep your mind sharp.  Improve balance to reduce your risk of falls.  Help you manage chronic illness with fewer medicines.  No matter how old you are, how fit you are, or what health problems you have, there is a form of activity that will work for you. And the more physical activity you can do, the better your overall health will be.  What kinds of activity can help you stay healthy?  Being more active will make your daily activities easier. Physical activity includes planned exercise and things you do in daily life. There are four types of activity:  Aerobic.  Doing aerobic activity makes your heart and lungs strong.  Includes walking, dancing, and gardening.  Aim for at least 2½ hours spread throughout the week.  It improves your energy and can help you sleep better.  Muscle-strengthening.  This type of activity can help maintain muscle and strengthen bones.  Includes climbing stairs, using resistance bands, and lifting or carrying heavy loads.  Aim for at least twice a week.  It can help protect the knees and other joints.  Stretching.  Stretching gives you better range of motion in joints and muscles.  Includes upper arm stretches, calf stretches, and gentle yoga.  Aim for at least twice a week, preferably after your muscles are warmed up from other activities.  It can help you function better in daily life.  Balancing.  This helps you stay coordinated and have good posture.  Includes heel-to-toe walking, meghan chi, and certain types of yoga.  Aim for at least 3 days a week.  It can reduce your risk of falling.  Even if you have a hard time meeting the recommendations, it's better to be more active

## 2025-05-07 NOTE — PROGRESS NOTES
Chief Complaint   Patient presents with    Medicare AWV     Have you been to the ER, urgent care clinic since your last visit?  Hospitalized since your last visit?   NO    Have you seen or consulted any other health care providers outside our system since your last visit?   NO      Have you had a colorectal cancer screening such as a colonoscopy/FIT/Cologuard?    NO    Date of last Colonoscopy: 6/15/2014  No cologuard on file  No FIT/FOBT on file   No flexible sigmoidoscopy on file     Have you had a diabetic eye exam?    NO     Date of last diabetic eye exam: 2/24/2020

## 2025-05-07 NOTE — PROGRESS NOTES
[facility]  Daniel Smith MD, FACP, CMD  [address] [facility] [facility] [address]  Phone:  [phone]  Fax: [phone]       Chief Complaint   Patient presents with    Medicare AWV   .      SUBJECTIVE:       History of Present Illness  The patient is a 74-year-old black female who presents for follow-up of status post removal of infected right maxillary hardware and ACCL on 10/29/2024. She continued to demonstrate appropriate healing postoperatively with no signs of communicating fistula and experienced symptoms of sinusitis, which can be managed conservatively with continued nasal use. She was cleared to resume wearing her dentures.    She has a history of diabetes mellitus type 2, fatty liver, primary hypertension, gallstones, diastolic dysfunction, and dyslipidemia. She is being evaluated for these conditions during this visit.    She reports experiencing shoulder discomfort, which intensifies during the night. She has been using Voltaren gel for relief but is concerned about potential liver complications associated with its use. She has not tried physical therapy for her shoulder condition and does not wish to pursue this treatment option.    She has a longstanding history of fatty liver disease, spanning 13 years. She has been advised to undergo a FibroScan but is hesitant due to concerns about potential surgical interventions. She reports no jaundice.    She has previously undergone a colon examination and is scheduled for a mammogram and eye examination this year, although she has not yet secured appointments for these procedures. She has a history of chickenpox and has received both the pneumonia and COVID-19 vaccines.    Current Outpatient Medications   Medication Sig Dispense Refill    metoprolol succinate (TOPROL XL) 25 MG extended release tablet TAKE 1 TABLET EVERY DAY 90 tablet 3    metFORMIN (GLUCOPHAGE-XR) 500 MG extended release tablet TAKE 1 TABLET WITH BREAKFAST AND TAKE

## 2025-05-08 ENCOUNTER — RESULTS FOLLOW-UP (OUTPATIENT)
Facility: CLINIC | Age: 75
End: 2025-05-08

## 2025-05-08 LAB
ALBUMIN SERPL-MCNC: 4.3 G/DL (ref 3.5–5)
ALBUMIN/GLOB SERPL: 1.2 (ref 1.1–2.2)
ALP SERPL-CCNC: 62 U/L (ref 45–117)
ALT SERPL-CCNC: 25 U/L (ref 12–78)
AMORPH CRY URNS QL MICRO: ABNORMAL
ANION GAP SERPL CALC-SCNC: 6 MMOL/L (ref 2–12)
APPEARANCE UR: ABNORMAL
AST SERPL-CCNC: 48 U/L (ref 15–37)
BACTERIA URNS QL MICRO: ABNORMAL /HPF
BASOPHILS # BLD: 0.04 K/UL (ref 0–0.1)
BASOPHILS NFR BLD: 0.9 % (ref 0–1)
BILIRUB SERPL-MCNC: 1 MG/DL (ref 0.2–1)
BILIRUB UR QL: NEGATIVE
BUN SERPL-MCNC: 20 MG/DL (ref 6–20)
BUN/CREAT SERPL: 22 (ref 12–20)
CALCIUM SERPL-MCNC: 10.3 MG/DL (ref 8.5–10.1)
CALCIUM SERPL-MCNC: 10.4 MG/DL (ref 8.5–10.1)
CAOX CRY URNS QL MICRO: ABNORMAL
CHLORIDE SERPL-SCNC: 105 MMOL/L (ref 97–108)
CHOLEST SERPL-MCNC: 116 MG/DL
CO2 SERPL-SCNC: 29 MMOL/L (ref 21–32)
COLOR UR: ABNORMAL
CREAT SERPL-MCNC: 0.9 MG/DL (ref 0.55–1.02)
CREAT UR-MCNC: 101 MG/DL
DIFFERENTIAL METHOD BLD: ABNORMAL
EOSINOPHIL # BLD: 0.06 K/UL (ref 0–0.4)
EOSINOPHIL NFR BLD: 1.4 % (ref 0–7)
EPITH CASTS URNS QL MICRO: ABNORMAL /LPF
ERYTHROCYTE [DISTWIDTH] IN BLOOD BY AUTOMATED COUNT: 13.8 % (ref 11.5–14.5)
EST. AVERAGE GLUCOSE BLD GHB EST-MCNC: 160 MG/DL
GLOBULIN SER CALC-MCNC: 3.6 G/DL (ref 2–4)
GLUCOSE SERPL-MCNC: 162 MG/DL (ref 65–100)
GLUCOSE UR STRIP.AUTO-MCNC: NEGATIVE MG/DL
HBA1C MFR BLD: 7.2 % (ref 4–5.6)
HCT VFR BLD AUTO: 39.5 % (ref 35–47)
HDLC SERPL-MCNC: 47 MG/DL
HDLC SERPL: 2.5 (ref 0–5)
HGB BLD-MCNC: 12.6 G/DL (ref 11.5–16)
HGB UR QL STRIP: NEGATIVE
IMM GRANULOCYTES # BLD AUTO: 0.01 K/UL (ref 0–0.04)
IMM GRANULOCYTES NFR BLD AUTO: 0.2 % (ref 0–0.5)
KETONES UR QL STRIP.AUTO: NEGATIVE MG/DL
LDLC SERPL CALC-MCNC: 49.8 MG/DL (ref 0–100)
LEUKOCYTE ESTERASE UR QL STRIP.AUTO: ABNORMAL
LYMPHOCYTES # BLD: 2.07 K/UL (ref 0.8–3.5)
LYMPHOCYTES NFR BLD: 48 % (ref 12–49)
MCH RBC QN AUTO: 28.9 PG (ref 26–34)
MCHC RBC AUTO-ENTMCNC: 31.9 G/DL (ref 30–36.5)
MCV RBC AUTO: 90.6 FL (ref 80–99)
MICROALBUMIN UR-MCNC: 8.1 MG/DL
MICROALBUMIN/CREAT UR-RTO: 80 MG/G (ref 0–30)
MONOCYTES # BLD: 0.39 K/UL (ref 0–1)
MONOCYTES NFR BLD: 9 % (ref 5–13)
NEUTS SEG # BLD: 1.74 K/UL (ref 1.8–8)
NEUTS SEG NFR BLD: 40.5 % (ref 32–75)
NITRITE UR QL STRIP.AUTO: NEGATIVE
NRBC # BLD: 0 K/UL (ref 0–0.01)
NRBC BLD-RTO: 0 PER 100 WBC
PH UR STRIP: 7 (ref 5–8)
PLATELET # BLD AUTO: 221 K/UL (ref 150–400)
PMV BLD AUTO: 10.1 FL (ref 8.9–12.9)
POTASSIUM SERPL-SCNC: 3.8 MMOL/L (ref 3.5–5.1)
PROT SERPL-MCNC: 7.9 G/DL (ref 6.4–8.2)
PROT UR STRIP-MCNC: 30 MG/DL
PTH-INTACT SERPL-MCNC: 26.6 PG/ML (ref 18.4–88)
RBC # BLD AUTO: 4.36 M/UL (ref 3.8–5.2)
RBC #/AREA URNS HPF: ABNORMAL /HPF (ref 0–5)
SODIUM SERPL-SCNC: 140 MMOL/L (ref 136–145)
SP GR UR REFRACTOMETRY: 1.02 (ref 1–1.03)
SPECIMEN HOLD: NORMAL
TRIGL SERPL-MCNC: 96 MG/DL
TSH SERPL DL<=0.05 MIU/L-ACNC: 1.46 UIU/ML (ref 0.36–3.74)
UROBILINOGEN UR QL STRIP.AUTO: 2 EU/DL (ref 0.2–1)
VLDLC SERPL CALC-MCNC: 19.2 MG/DL
WBC # BLD AUTO: 4.3 K/UL (ref 3.6–11)
WBC URNS QL MICRO: ABNORMAL /HPF (ref 0–4)

## 2025-06-10 RX ORDER — FLUCONAZOLE 150 MG/1
150 TABLET ORAL ONCE
Qty: 1 TABLET | Refills: 3 | Status: SHIPPED | OUTPATIENT
Start: 2025-06-10 | End: 2025-06-10

## 2025-06-17 ENCOUNTER — HOSPITAL ENCOUNTER (OUTPATIENT)
Facility: HOSPITAL | Age: 75
Discharge: HOME | End: 2025-06-20
Attending: INTERNAL MEDICINE
Payer: MEDICARE

## 2025-06-17 VITALS — BODY MASS INDEX: 22.24 KG/M2 | WEIGHT: 130.29 LBS | HEIGHT: 64 IN

## 2025-06-17 DIAGNOSIS — N64.89 BREAST ASYMMETRY: Primary | ICD-10-CM

## 2025-06-17 DIAGNOSIS — Z12.31 ENCOUNTER FOR SCREENING MAMMOGRAM FOR MALIGNANT NEOPLASM OF BREAST: ICD-10-CM

## 2025-06-17 DIAGNOSIS — E11.9 TYPE 2 DIABETES MELLITUS WITHOUT COMPLICATION, WITHOUT LONG-TERM CURRENT USE OF INSULIN (HCC): ICD-10-CM

## 2025-06-17 PROCEDURE — 77063 BREAST TOMOSYNTHESIS BI: CPT

## 2025-06-27 ENCOUNTER — HOSPITAL ENCOUNTER (OUTPATIENT)
Facility: HOSPITAL | Age: 75
Discharge: HOME | End: 2025-06-30
Attending: INTERNAL MEDICINE
Payer: MEDICARE

## 2025-06-27 DIAGNOSIS — N64.89 BREAST ASYMMETRY: ICD-10-CM

## 2025-06-27 PROCEDURE — G0279 TOMOSYNTHESIS, MAMMO: HCPCS

## 2025-06-27 PROCEDURE — 76642 ULTRASOUND BREAST LIMITED: CPT

## 2025-06-28 DIAGNOSIS — N64.89 BREAST ASYMMETRY: Primary | ICD-10-CM

## 2025-07-15 RX ORDER — VALSARTAN AND HYDROCHLOROTHIAZIDE 320; 12.5 MG/1; MG/1
1 TABLET, FILM COATED ORAL DAILY
Qty: 90 TABLET | Refills: 3 | Status: SHIPPED | OUTPATIENT
Start: 2025-07-15

## 2025-07-15 RX ORDER — ROSUVASTATIN CALCIUM 20 MG/1
20 TABLET, COATED ORAL
Qty: 90 TABLET | Refills: 3 | Status: SHIPPED | OUTPATIENT
Start: 2025-07-15